# Patient Record
Sex: MALE | Race: WHITE | Employment: OTHER | URBAN - METROPOLITAN AREA
[De-identification: names, ages, dates, MRNs, and addresses within clinical notes are randomized per-mention and may not be internally consistent; named-entity substitution may affect disease eponyms.]

---

## 2016-12-30 RX ORDER — LIDOCAINE HYDROCHLORIDE 10 MG/ML
0.1 INJECTION, SOLUTION EPIDURAL; INFILTRATION; INTRACAUDAL; PERINEURAL ONCE
Status: CANCELLED | OUTPATIENT
Start: 2016-12-30 | End: 2016-12-30

## 2016-12-30 RX ORDER — SODIUM CHLORIDE, SODIUM LACTATE, POTASSIUM CHLORIDE, CALCIUM CHLORIDE 600; 310; 30; 20 MG/100ML; MG/100ML; MG/100ML; MG/100ML
INJECTION, SOLUTION INTRAVENOUS CONTINUOUS
Status: CANCELLED | OUTPATIENT
Start: 2016-12-30

## 2017-01-03 ENCOUNTER — HOSPITAL ENCOUNTER (OUTPATIENT)
Dept: SURGERY | Age: 62
Discharge: HOME | End: 2017-01-03
Attending: INTERNAL MEDICINE | Admitting: INTERNAL MEDICINE

## 2017-01-05 ENCOUNTER — HOSPITAL ENCOUNTER (OUTPATIENT)
Dept: SURGERY | Age: 62
Discharge: HOME | End: 2017-01-05
Attending: INTERNAL MEDICINE | Admitting: INTERNAL MEDICINE

## 2017-01-05 VITALS
WEIGHT: 200 LBS | HEART RATE: 65 BPM | HEIGHT: 73 IN | BODY MASS INDEX: 26.51 KG/M2 | SYSTOLIC BLOOD PRESSURE: 120 MMHG | OXYGEN SATURATION: 96 % | RESPIRATION RATE: 18 BRPM | DIASTOLIC BLOOD PRESSURE: 80 MMHG | TEMPERATURE: 98.8 F

## 2017-01-05 RX ORDER — LIDOCAINE HYDROCHLORIDE 10 MG/ML
0.1 INJECTION, SOLUTION EPIDURAL; INFILTRATION; INTRACAUDAL; PERINEURAL ONCE
Status: DISCONTINUED | OUTPATIENT
Start: 2017-01-05 | End: 2017-01-06 | Stop reason: HOSPADM

## 2017-01-05 RX ORDER — SODIUM CHLORIDE, SODIUM LACTATE, POTASSIUM CHLORIDE, CALCIUM CHLORIDE 600; 310; 30; 20 MG/100ML; MG/100ML; MG/100ML; MG/100ML
INJECTION, SOLUTION INTRAVENOUS CONTINUOUS
Status: DISCONTINUED | OUTPATIENT
Start: 2017-01-05 | End: 2017-01-05

## 2017-01-05 RX ORDER — LIDOCAINE HYDROCHLORIDE 10 MG/ML
0.1 INJECTION, SOLUTION EPIDURAL; INFILTRATION; INTRACAUDAL; PERINEURAL ONCE
Status: DISCONTINUED | OUTPATIENT
Start: 2017-01-05 | End: 2017-01-05

## 2017-01-05 RX ORDER — SODIUM CHLORIDE, SODIUM LACTATE, POTASSIUM CHLORIDE, CALCIUM CHLORIDE 600; 310; 30; 20 MG/100ML; MG/100ML; MG/100ML; MG/100ML
INJECTION, SOLUTION INTRAVENOUS CONTINUOUS
Status: DISCONTINUED | OUTPATIENT
Start: 2017-01-05 | End: 2017-01-06 | Stop reason: HOSPADM

## 2017-01-05 RX ADMIN — SODIUM CHLORIDE, SODIUM LACTATE, POTASSIUM CHLORIDE, CALCIUM CHLORIDE: 600; 310; 30; 20 INJECTION, SOLUTION INTRAVENOUS at 13:22

## 2017-01-05 ASSESSMENT — PAIN - FUNCTIONAL ASSESSMENT: PAIN_FUNCTIONAL_ASSESSMENT: 0-10

## 2017-01-05 ASSESSMENT — PAIN SCALES - GENERAL: PAINLEVEL_OUTOF10: 0

## 2017-01-10 ENCOUNTER — ORTHOTIC/BRACE ENCOUNTER (OUTPATIENT)
Dept: ORTHOPEDIC SURGERY | Age: 62
End: 2017-01-10

## 2017-01-27 ENCOUNTER — ORTHOTIC/BRACE ENCOUNTER (OUTPATIENT)
Dept: ORTHOPEDIC SURGERY | Age: 62
End: 2017-01-27

## 2017-01-27 DIAGNOSIS — G89.29 CHRONIC PAIN OF LEFT ANKLE: Primary | ICD-10-CM

## 2017-01-27 DIAGNOSIS — M19.079 ANKLE ARTHRITIS: ICD-10-CM

## 2017-01-27 DIAGNOSIS — M25.572 CHRONIC PAIN OF LEFT ANKLE: Primary | ICD-10-CM

## 2017-01-27 PROCEDURE — L2275 PLASTIC MOD LOW EXT PAD/LINE: HCPCS | Performed by: PEDORTHIST

## 2017-01-27 PROCEDURE — L1960 AFO POS SOLID ANK PLASTIC MO: HCPCS | Performed by: PEDORTHIST

## 2017-01-27 PROCEDURE — L2820 SOFT INTERFACE BELOW KNEE SE: HCPCS | Performed by: PEDORTHIST

## 2022-04-01 PROCEDURE — 99285 EMERGENCY DEPT VISIT HI MDM: CPT

## 2022-04-02 ENCOUNTER — APPOINTMENT (OUTPATIENT)
Dept: CT IMAGING | Age: 67
End: 2022-04-02
Payer: MEDICARE

## 2022-04-02 ENCOUNTER — HOSPITAL ENCOUNTER (EMERGENCY)
Age: 67
Discharge: ANOTHER HOSPITAL | End: 2022-04-02
Attending: EMERGENCY MEDICINE
Payer: MEDICARE

## 2022-04-02 ENCOUNTER — APPOINTMENT (OUTPATIENT)
Dept: CT IMAGING | Age: 67
DRG: 068 | End: 2022-04-02
Attending: INTERNAL MEDICINE
Payer: MEDICARE

## 2022-04-02 ENCOUNTER — HOSPITAL ENCOUNTER (INPATIENT)
Age: 67
LOS: 4 days | Discharge: HOME | DRG: 068 | End: 2022-04-06
Attending: INTERNAL MEDICINE | Admitting: INTERNAL MEDICINE
Payer: MEDICARE

## 2022-04-02 ENCOUNTER — APPOINTMENT (OUTPATIENT)
Dept: GENERAL RADIOLOGY | Age: 67
End: 2022-04-02
Payer: MEDICARE

## 2022-04-02 VITALS
HEART RATE: 73 BPM | WEIGHT: 200 LBS | SYSTOLIC BLOOD PRESSURE: 99 MMHG | DIASTOLIC BLOOD PRESSURE: 74 MMHG | BODY MASS INDEX: 26.39 KG/M2 | OXYGEN SATURATION: 99 % | TEMPERATURE: 96.7 F | RESPIRATION RATE: 16 BRPM

## 2022-04-02 DIAGNOSIS — I48.91 ATRIAL FIBRILLATION, UNSPECIFIED TYPE (HCC): ICD-10-CM

## 2022-04-02 DIAGNOSIS — I48.0 PAROXYSMAL ATRIAL FIBRILLATION (HCC): Primary | ICD-10-CM

## 2022-04-02 DIAGNOSIS — G45.0 TIA INVOLVING BASILAR ARTERY: ICD-10-CM

## 2022-04-02 DIAGNOSIS — H53.2 DIPLOPIA: Primary | ICD-10-CM

## 2022-04-02 PROBLEM — I63.9 ACUTE CEREBROVASCULAR ACCIDENT (CVA) (HCC): Status: ACTIVE | Noted: 2022-04-02

## 2022-04-02 LAB
A/G RATIO: 1.7 (ref 1.1–2.2)
ALBUMIN SERPL-MCNC: 4.5 G/DL (ref 3.4–5)
ALP BLD-CCNC: 103 U/L (ref 40–129)
ALT SERPL-CCNC: 16 U/L (ref 10–40)
ANION GAP SERPL CALCULATED.3IONS-SCNC: 14 MMOL/L (ref 3–16)
ANION GAP SERPL CALCULATED.3IONS-SCNC: 7 MMOL/L (ref 3–16)
ANISOCYTOSIS: ABNORMAL
AST SERPL-CCNC: 27 U/L (ref 15–37)
BASOPHILS ABSOLUTE: 0 K/UL (ref 0–0.2)
BASOPHILS RELATIVE PERCENT: 0 %
BILIRUB SERPL-MCNC: 0.8 MG/DL (ref 0–1)
BILIRUBIN URINE: NEGATIVE
BLOOD, URINE: ABNORMAL
BUN BLDV-MCNC: 24 MG/DL (ref 7–20)
BUN BLDV-MCNC: 31 MG/DL (ref 7–20)
CALCIUM SERPL-MCNC: 8.2 MG/DL (ref 8.3–10.6)
CALCIUM SERPL-MCNC: 9.4 MG/DL (ref 8.3–10.6)
CHLORIDE BLD-SCNC: 103 MMOL/L (ref 99–110)
CHLORIDE BLD-SCNC: 96 MMOL/L (ref 99–110)
CLARITY: CLEAR
CO2: 25 MMOL/L (ref 21–32)
CO2: 26 MMOL/L (ref 21–32)
COLOR: YELLOW
CREAT SERPL-MCNC: 1.1 MG/DL (ref 0.8–1.3)
CREAT SERPL-MCNC: 1.5 MG/DL (ref 0.8–1.3)
EKG ATRIAL RATE: 170 BPM
EKG DIAGNOSIS: NORMAL
EKG Q-T INTERVAL: 306 MS
EKG QRS DURATION: 82 MS
EKG QTC CALCULATION (BAZETT): 483 MS
EKG R AXIS: 70 DEGREES
EKG T AXIS: 262 DEGREES
EKG VENTRICULAR RATE: 150 BPM
EOSINOPHILS ABSOLUTE: 0 K/UL (ref 0–0.6)
EOSINOPHILS RELATIVE PERCENT: 0 %
GFR AFRICAN AMERICAN: 57
GFR AFRICAN AMERICAN: >60
GFR NON-AFRICAN AMERICAN: 47
GFR NON-AFRICAN AMERICAN: >60
GLUCOSE BLD-MCNC: 114 MG/DL (ref 70–99)
GLUCOSE BLD-MCNC: 92 MG/DL (ref 70–99)
GLUCOSE URINE: NEGATIVE MG/DL
HCT VFR BLD CALC: 51 % (ref 40.5–52.5)
HEMOGLOBIN: 17.2 G/DL (ref 13.5–17.5)
HYALINE CASTS: ABNORMAL /LPF (ref 0–2)
INFLUENZA A: NOT DETECTED
INFLUENZA B: NOT DETECTED
INR BLD: 1 (ref 0.88–1.12)
KETONES, URINE: NEGATIVE MG/DL
LACTIC ACID, SEPSIS: 1.5 MMOL/L (ref 0.4–1.9)
LEUKOCYTE ESTERASE, URINE: NEGATIVE
LYMPHOCYTES ABSOLUTE: 2.9 K/UL (ref 1–5.1)
LYMPHOCYTES RELATIVE PERCENT: 20 %
MCH RBC QN AUTO: 29.1 PG (ref 26–34)
MCHC RBC AUTO-ENTMCNC: 33.8 G/DL (ref 31–36)
MCV RBC AUTO: 86.3 FL (ref 80–100)
MICROSCOPIC EXAMINATION: YES
MONOCYTES ABSOLUTE: 1.5 K/UL (ref 0–1.3)
MONOCYTES RELATIVE PERCENT: 10 %
MUCUS: ABNORMAL /LPF
NEUTROPHILS ABSOLUTE: 10.2 K/UL (ref 1.7–7.7)
NEUTROPHILS RELATIVE PERCENT: 70 %
NITRITE, URINE: NEGATIVE
PDW BLD-RTO: 15.5 % (ref 12.4–15.4)
PH UA: 5.5 (ref 5–8)
PLATELET # BLD: 303 K/UL (ref 135–450)
PLATELET SLIDE REVIEW: ADEQUATE
PMV BLD AUTO: 7.2 FL (ref 5–10.5)
POTASSIUM REFLEX MAGNESIUM: 4 MMOL/L (ref 3.5–5.1)
POTASSIUM SERPL-SCNC: 3.7 MMOL/L (ref 3.5–5.1)
PROCALCITONIN: 0.05 NG/ML (ref 0–0.15)
PROTEIN UA: NEGATIVE MG/DL
PROTHROMBIN TIME: 11.3 SEC (ref 9.9–12.7)
RBC # BLD: 5.91 M/UL (ref 4.2–5.9)
RBC UA: ABNORMAL /HPF (ref 0–4)
SARS-COV-2 RNA, RT PCR: NOT DETECTED
SLIDE REVIEW: ABNORMAL
SODIUM BLD-SCNC: 135 MMOL/L (ref 136–145)
SODIUM BLD-SCNC: 136 MMOL/L (ref 136–145)
SPECIFIC GRAVITY UA: 1.02 (ref 1–1.03)
TOTAL PROTEIN: 7.2 G/DL (ref 6.4–8.2)
TROPONIN: <0.01 NG/ML
TSH REFLEX: 1.46 UIU/ML (ref 0.27–4.2)
URINE REFLEX TO CULTURE: ABNORMAL
URINE TYPE: ABNORMAL
UROBILINOGEN, URINE: 0.2 E.U./DL
WBC # BLD: 14.5 K/UL (ref 4–11)
WBC UA: ABNORMAL /HPF (ref 0–5)

## 2022-04-02 PROCEDURE — 84484 ASSAY OF TROPONIN QUANT: CPT

## 2022-04-02 PROCEDURE — 81001 URINALYSIS AUTO W/SCOPE: CPT

## 2022-04-02 PROCEDURE — 6360000002 HC RX W HCPCS: Performed by: INTERNAL MEDICINE

## 2022-04-02 PROCEDURE — 85610 PROTHROMBIN TIME: CPT

## 2022-04-02 PROCEDURE — 6370000000 HC RX 637 (ALT 250 FOR IP): Performed by: EMERGENCY MEDICINE

## 2022-04-02 PROCEDURE — 96366 THER/PROPH/DIAG IV INF ADDON: CPT

## 2022-04-02 PROCEDURE — 2580000003 HC RX 258: Performed by: EMERGENCY MEDICINE

## 2022-04-02 PROCEDURE — 80053 COMPREHEN METABOLIC PANEL: CPT

## 2022-04-02 PROCEDURE — 84443 ASSAY THYROID STIM HORMONE: CPT

## 2022-04-02 PROCEDURE — 74176 CT ABD & PELVIS W/O CONTRAST: CPT

## 2022-04-02 PROCEDURE — 87040 BLOOD CULTURE FOR BACTERIA: CPT

## 2022-04-02 PROCEDURE — 6360000004 HC RX CONTRAST MEDICATION: Performed by: EMERGENCY MEDICINE

## 2022-04-02 PROCEDURE — 71045 X-RAY EXAM CHEST 1 VIEW: CPT

## 2022-04-02 PROCEDURE — 2580000003 HC RX 258: Performed by: INTERNAL MEDICINE

## 2022-04-02 PROCEDURE — 36415 COLL VENOUS BLD VENIPUNCTURE: CPT

## 2022-04-02 PROCEDURE — 96376 TX/PRO/DX INJ SAME DRUG ADON: CPT

## 2022-04-02 PROCEDURE — 6360000004 HC RX CONTRAST MEDICATION: Performed by: INTERNAL MEDICINE

## 2022-04-02 PROCEDURE — 6370000000 HC RX 637 (ALT 250 FOR IP): Performed by: INTERNAL MEDICINE

## 2022-04-02 PROCEDURE — 2500000003 HC RX 250 WO HCPCS: Performed by: INTERNAL MEDICINE

## 2022-04-02 PROCEDURE — 1200000000 HC SEMI PRIVATE

## 2022-04-02 PROCEDURE — 80048 BASIC METABOLIC PNL TOTAL CA: CPT

## 2022-04-02 PROCEDURE — 70496 CT ANGIOGRAPHY HEAD: CPT

## 2022-04-02 PROCEDURE — 96365 THER/PROPH/DIAG IV INF INIT: CPT

## 2022-04-02 PROCEDURE — 87636 SARSCOV2 & INF A&B AMP PRB: CPT

## 2022-04-02 PROCEDURE — 83605 ASSAY OF LACTIC ACID: CPT

## 2022-04-02 PROCEDURE — 2500000003 HC RX 250 WO HCPCS: Performed by: EMERGENCY MEDICINE

## 2022-04-02 PROCEDURE — 93005 ELECTROCARDIOGRAM TRACING: CPT | Performed by: EMERGENCY MEDICINE

## 2022-04-02 PROCEDURE — 84145 PROCALCITONIN (PCT): CPT

## 2022-04-02 PROCEDURE — 96367 TX/PROPH/DG ADDL SEQ IV INF: CPT

## 2022-04-02 PROCEDURE — 70450 CT HEAD/BRAIN W/O DYE: CPT

## 2022-04-02 PROCEDURE — 85025 COMPLETE CBC W/AUTO DIFF WBC: CPT

## 2022-04-02 PROCEDURE — 80061 LIPID PANEL: CPT

## 2022-04-02 PROCEDURE — 6360000002 HC RX W HCPCS: Performed by: EMERGENCY MEDICINE

## 2022-04-02 PROCEDURE — 83036 HEMOGLOBIN GLYCOSYLATED A1C: CPT

## 2022-04-02 RX ORDER — DILTIAZEM HYDROCHLORIDE 5 MG/ML
5 INJECTION INTRAVENOUS ONCE
Status: COMPLETED | OUTPATIENT
Start: 2022-04-02 | End: 2022-04-02

## 2022-04-02 RX ORDER — DULOXETIN HYDROCHLORIDE 60 MG/1
60 CAPSULE, DELAYED RELEASE ORAL DAILY
COMMUNITY
Start: 2021-11-26

## 2022-04-02 RX ORDER — OXYCODONE HYDROCHLORIDE 15 MG/1
15 TABLET ORAL 4 TIMES DAILY PRN
COMMUNITY
Start: 2021-10-05

## 2022-04-02 RX ORDER — SODIUM CHLORIDE 9 MG/ML
INJECTION, SOLUTION INTRAVENOUS CONTINUOUS
Status: DISCONTINUED | OUTPATIENT
Start: 2022-04-02 | End: 2022-04-04

## 2022-04-02 RX ORDER — 0.9 % SODIUM CHLORIDE 0.9 %
1000 INTRAVENOUS SOLUTION INTRAVENOUS ONCE
Status: DISCONTINUED | OUTPATIENT
Start: 2022-04-02 | End: 2022-04-02

## 2022-04-02 RX ORDER — METRONIDAZOLE 250 MG/1
500 TABLET ORAL EVERY 8 HOURS SCHEDULED
Status: DISCONTINUED | OUTPATIENT
Start: 2022-04-02 | End: 2022-04-06 | Stop reason: HOSPADM

## 2022-04-02 RX ORDER — 0.9 % SODIUM CHLORIDE 0.9 %
30 INTRAVENOUS SOLUTION INTRAVENOUS ONCE
Status: COMPLETED | OUTPATIENT
Start: 2022-04-02 | End: 2022-04-02

## 2022-04-02 RX ORDER — HEPARIN SODIUM 5000 [USP'U]/ML
5000 INJECTION, SOLUTION INTRAVENOUS; SUBCUTANEOUS EVERY 8 HOURS SCHEDULED
Status: DISCONTINUED | OUTPATIENT
Start: 2022-04-02 | End: 2022-04-04

## 2022-04-02 RX ORDER — CIPROFLOXACIN 500 MG/1
500 TABLET, FILM COATED ORAL EVERY 12 HOURS SCHEDULED
Status: DISCONTINUED | OUTPATIENT
Start: 2022-04-02 | End: 2022-04-06 | Stop reason: HOSPADM

## 2022-04-02 RX ORDER — OXYCODONE HYDROCHLORIDE 15 MG/1
15 TABLET ORAL 4 TIMES DAILY PRN
Status: DISCONTINUED | OUTPATIENT
Start: 2022-04-02 | End: 2022-04-06 | Stop reason: HOSPADM

## 2022-04-02 RX ORDER — ONDANSETRON 2 MG/ML
4 INJECTION INTRAMUSCULAR; INTRAVENOUS EVERY 6 HOURS PRN
Status: DISCONTINUED | OUTPATIENT
Start: 2022-04-02 | End: 2022-04-06 | Stop reason: HOSPADM

## 2022-04-02 RX ORDER — DULOXETIN HYDROCHLORIDE 60 MG/1
60 CAPSULE, DELAYED RELEASE ORAL DAILY
Status: DISCONTINUED | OUTPATIENT
Start: 2022-04-02 | End: 2022-04-06 | Stop reason: HOSPADM

## 2022-04-02 RX ORDER — ASPIRIN 81 MG/1
81 TABLET, CHEWABLE ORAL ONCE
Status: COMPLETED | OUTPATIENT
Start: 2022-04-02 | End: 2022-04-02

## 2022-04-02 RX ORDER — LABETALOL HYDROCHLORIDE 5 MG/ML
10 INJECTION, SOLUTION INTRAVENOUS EVERY 10 MIN PRN
Status: DISCONTINUED | OUTPATIENT
Start: 2022-04-02 | End: 2022-04-06 | Stop reason: HOSPADM

## 2022-04-02 RX ORDER — ATORVASTATIN CALCIUM 80 MG/1
80 TABLET, FILM COATED ORAL NIGHTLY
Status: DISCONTINUED | OUTPATIENT
Start: 2022-04-02 | End: 2022-04-06 | Stop reason: HOSPADM

## 2022-04-02 RX ORDER — 0.9 % SODIUM CHLORIDE 0.9 %
1000 INTRAVENOUS SOLUTION INTRAVENOUS ONCE
Status: COMPLETED | OUTPATIENT
Start: 2022-04-02 | End: 2022-04-02

## 2022-04-02 RX ORDER — ONDANSETRON 4 MG/1
4 TABLET, ORALLY DISINTEGRATING ORAL EVERY 8 HOURS PRN
Status: DISCONTINUED | OUTPATIENT
Start: 2022-04-02 | End: 2022-04-06 | Stop reason: HOSPADM

## 2022-04-02 RX ORDER — POLYETHYLENE GLYCOL 3350 17 G/17G
17 POWDER, FOR SOLUTION ORAL DAILY PRN
Status: DISCONTINUED | OUTPATIENT
Start: 2022-04-02 | End: 2022-04-06 | Stop reason: HOSPADM

## 2022-04-02 RX ORDER — ASPIRIN 300 MG/1
300 SUPPOSITORY RECTAL DAILY
Status: DISCONTINUED | OUTPATIENT
Start: 2022-04-02 | End: 2022-04-06 | Stop reason: HOSPADM

## 2022-04-02 RX ORDER — ASPIRIN 81 MG/1
81 TABLET ORAL DAILY
Status: DISCONTINUED | OUTPATIENT
Start: 2022-04-02 | End: 2022-04-06 | Stop reason: HOSPADM

## 2022-04-02 RX ADMIN — OXYCODONE HYDROCHLORIDE 15 MG: 15 TABLET ORAL at 15:13

## 2022-04-02 RX ADMIN — DILTIAZEM HYDROCHLORIDE 2.5 MG/HR: 5 INJECTION INTRAVENOUS at 12:32

## 2022-04-02 RX ADMIN — SODIUM CHLORIDE: 9 INJECTION, SOLUTION INTRAVENOUS at 12:38

## 2022-04-02 RX ADMIN — VANCOMYCIN HYDROCHLORIDE 1750 MG: 1 INJECTION, POWDER, LYOPHILIZED, FOR SOLUTION INTRAVENOUS at 07:42

## 2022-04-02 RX ADMIN — ASPIRIN 81 MG: 81 TABLET, COATED ORAL at 16:17

## 2022-04-02 RX ADMIN — SODIUM CHLORIDE 1000 ML: 9 INJECTION, SOLUTION INTRAVENOUS at 01:04

## 2022-04-02 RX ADMIN — METRONIDAZOLE 500 MG: 250 TABLET ORAL at 15:08

## 2022-04-02 RX ADMIN — IOPAMIDOL 85 ML: 755 INJECTION, SOLUTION INTRAVENOUS at 00:42

## 2022-04-02 RX ADMIN — SODIUM CHLORIDE 2721 ML: 9 INJECTION, SOLUTION INTRAVENOUS at 01:20

## 2022-04-02 RX ADMIN — DULOXETINE HYDROCHLORIDE 60 MG: 60 CAPSULE, DELAYED RELEASE ORAL at 15:08

## 2022-04-02 RX ADMIN — DILTIAZEM HYDROCHLORIDE 5 MG: 5 INJECTION INTRAVENOUS at 01:12

## 2022-04-02 RX ADMIN — HEPARIN SODIUM 5000 UNITS: 5000 INJECTION INTRAVENOUS; SUBCUTANEOUS at 21:08

## 2022-04-02 RX ADMIN — CIPROFLOXACIN 500 MG: 500 TABLET, FILM COATED ORAL at 21:07

## 2022-04-02 RX ADMIN — OXYCODONE HYDROCHLORIDE 15 MG: 15 TABLET ORAL at 22:53

## 2022-04-02 RX ADMIN — HEPARIN SODIUM 5000 UNITS: 5000 INJECTION INTRAVENOUS; SUBCUTANEOUS at 15:08

## 2022-04-02 RX ADMIN — ATORVASTATIN CALCIUM 80 MG: 80 TABLET, FILM COATED ORAL at 21:07

## 2022-04-02 RX ADMIN — DILTIAZEM HYDROCHLORIDE 5 MG/HR: 5 INJECTION, SOLUTION INTRAVENOUS at 01:14

## 2022-04-02 RX ADMIN — IOHEXOL 50 ML: 240 INJECTION, SOLUTION INTRATHECAL; INTRAVASCULAR; INTRAVENOUS; ORAL at 12:33

## 2022-04-02 RX ADMIN — CEFEPIME 2000 MG: 2 INJECTION, POWDER, FOR SOLUTION INTRAVENOUS at 06:06

## 2022-04-02 RX ADMIN — ASPIRIN 81 MG 81 MG: 81 TABLET ORAL at 01:15

## 2022-04-02 RX ADMIN — METRONIDAZOLE 500 MG: 250 TABLET ORAL at 21:07

## 2022-04-02 ASSESSMENT — PAIN SCALES - GENERAL
PAINLEVEL_OUTOF10: 0
PAINLEVEL_OUTOF10: 0
PAINLEVEL_OUTOF10: 5
PAINLEVEL_OUTOF10: 0
PAINLEVEL_OUTOF10: 6

## 2022-04-02 NOTE — H&P
Hospital Medicine History & Physical      PCP: Omkar Fernandez MD    Date of Admission: 4/2/2022    Date of Service: Pt seen/examined on 04/02/22 and Admitted to Inpatient with expected LOS greater than two midnights due to medical therapy of diplopia, suspected CVA. Chief Complaint: Double vision      History Of Present Illness:      74 y.o. male who presented to his local emergency department for double vision and dizziness that started acutely when he was chopping wood at home. Initially had double vision, followed by generalized fatigue and some palpitations in addition to dizziness. Thought he would pass out, but he did not. No spinning sensation. Did not fall, did not lose consciousness. Waited at home, and then came to the emergency department once he decided that things were not going to get better. At the emergency department, there was a suspicion for an acute CVA. Stroke team was called. tPA was not administered due to patient being out of time window. Aspirin was started and patient was transferred to our hospital for admission. Also noted that patient had leukocytosis. Empiric antibiotic a single dose was given. No fever, no chills. Diagnosed with atrial fibrillation, which seems to be something new. Patient states that last week he noticed another episode of diverticulitis that he had before. Switched to clear liquid on his own and noted some improvement couple days ago. However, he has not had any bowel movements and still was not able to eat much. The episode of suspected diverticulitis started with nausea and vomiting. Since then, oral intake has been reduced.   No other accompanying symptoms  Nothing else that makes the patient feel better or worse    Past Medical History:          Diagnosis Date    Arthritis     Cerebral artery occlusion with cerebral infarction [facility])        Past Surgical History:          Procedure Laterality Date    ANKLE SURGERY Left     surgeries x3- 2007,2008,2011    BACK SURGERY      COLONOSCOPY  11/20/2007    WNL    COLONOSCOPY  05 Jan 2017    diverticulosis    FRACTURE SURGERY      JOINT REPLACEMENT         Medications Prior to Admission:      Prior to Admission medications    Medication Sig Start Date End Date Taking? Authorizing Provider   DULoxetine (CYMBALTA) 60 MG extended release capsule Take 60 mg by mouth daily 11/26/21   Historical Provider, MD   oxyCODONE (OXY-IR) 15 MG immediate release tablet Take 15 mg by mouth 4 times daily as needed. 10/5/21   Historical Provider, MD       Allergies:  Patient has no known allergies. Social History:      The patient currently lives at home    TOBACCO:   reports that he has been smoking. He started smoking about 39 years ago. He has a 40.00 pack-year smoking history. He has never used smokeless tobacco.  ETOH:   reports no history of alcohol use. E-Cigarettes/Vaping Use     Questions Responses    E-Cigarette/Vaping Use Never User    Start Date     Passive Exposure     Quit Date     Counseling Given     Comments             Family History:      Reviewed in detail and negative for DM, CAD, Cancer, CVA. Positive as follows:        Problem Relation Age of Onset    Other Mother         whipple procedure for what they thought was pancreatic tumor-- no tumor found    Cancer Maternal Grandmother         colon cancer - age late 63's       REVIEW OF SYSTEMS COMPLETED:   Pertinent positives as noted in the HPI. Double vision. All other systems reviewed and negative. PHYSICAL EXAM PERFORMED:    /74   Pulse 77   Temp 98.3 °F (36.8 °C) (Oral)   Resp 18   Ht 6' 1\" (1.854 m)   Wt 184 lb 12.8 oz (83.8 kg)   SpO2 99%   BMI 24.38 kg/m²     General appearance:  No apparent distress, appears stated age and cooperative. HEENT:  Normal cephalic, atraumatic without obvious deformity. Pupils equal, round, and reactive to light. Extra ocular muscles intact. Conjunctivae/corneas clear.   Neck: Supple, with full range of motion. No jugular venous distention. Trachea midline. Respiratory:  Normal respiratory effort. Clear to auscultation, bilaterally without Rales/Wheezes/Rhonchi. Cardiovascular: Irregularly irregular rhythm with normal S1/S2 without murmurs, rubs or gallops. Abdomen: Soft, lower abdomen tender, more on the right than left with no rebound tenderness. Abdomen is non-distended with normal bowel sounds. Musculoskeletal:  No clubbing, cyanosis or edema bilaterally. Full range of motion without deformity. Skin: Skin color, texture, turgor normal.  No rashes or lesions. Neurologic:  Neurovascularly intact without any focal sensory/motor deficits.  Cranial nerves: II-XII intact, grossly non-focal.  Psychiatric:  Alert and oriented, thought content appropriate, normal insight  Capillary Refill: Brisk,3 seconds, normal  Peripheral Pulses: +2 palpable, equal bilaterally       Labs:     Recent Labs     04/02/22 0011   WBC 14.5*   HGB 17.2   HCT 51.0        Recent Labs     04/02/22 0011   *   K 4.0   CL 96*   CO2 25   BUN 31*   CREATININE 1.5*   CALCIUM 9.4     Recent Labs     04/02/22  0011   AST 27   ALT 16   BILITOT 0.8   ALKPHOS 103     Recent Labs     04/02/22 0011   INR 1.00     Recent Labs     04/02/22  0011   TROPONINI <0.01       Urinalysis:      Lab Results   Component Value Date    NITRU Negative 04/02/2022    WBCUA 0-2 04/02/2022    RBCUA 5-10 04/02/2022    BLOODU SMALL 04/02/2022    SPECGRAV 1.020 04/02/2022    GLUCOSEU Negative 04/02/2022       Radiology:     CXR: I have reviewed the CXR with the following interpretation: Not done today  EKG:  I have reviewed the EKG with the following interpretation: Atrial fibrillation with rapid ventricular response    No orders to display       ASSESSMENT:    Active Hospital Problems    Diagnosis Date Noted    Acute cerebrovascular accident (CVA) ([facility] [address] [I63.9] 04/02/2022         PLAN:    Double vision  Suspected but not diagnosed acute CVA.  Neurology consulted  MRI of the brain ordered  No TPA as the patient is out of time window    New onset atrial fibrillation  Started with rapid response, currently heart rate is controlled. Cardiology consulted  TSH and troponins ordered  Echocardiogram to be done. Leukocytosis  Could be secondary to acute diverticulitis. Procalcitonin is normal.  CT scan of the abdomen and pelvis ordered. Suspected acute diverticulitis  Patient has self diagnosed with acute diverticulitis last week, based on previous symptoms. Indeed, physical exam shows lower abdominal tenderness. I ordered a CT scan of the abdomen and pelvis and started the patient on oral Cipro and Flagyl combination for now. My main concern is possibility of perforation or abscess formation. Because of elevated creatinine, I will order CT scan with oral contrast only. Acute kidney injury  Baseline creatinine 0.8. Today's creatinine is 1.5. Could be related to dehydration. We will start the patient on IV fluids for now. Poor oral intake noted over past week    Discussed with the patient. Questions answered    DVT Prophylaxis: Subcutaneous heparin. Awaiting cardiology and neurology opinions for therapeutic anticoagulation  Diet: Diet NPO Exceptions are: Ice Chips, Sips of Water with Meds, Sips of Clear Liquids. Proceed to full liquids patient passes swallow screen  Code Status: Full Code    PT/OT Eval Status: Requested    Dispo -inpatient stay pending specialist opinion       Tristian Booth MD    Thank you Tomer Seals MD for the opportunity to be involved in this patient's care. If you have any questions or concerns please feel free to contact me at [phone].

## 2022-04-02 NOTE — PROGRESS NOTES
TODAYS DATE:  4/2/2022    Discussed personal risk factors for Stroke /TIA with patient/family, and ways to reduce the risk for a recurrent stroke. Patient's personal risk factors which were identified are:     [] Alcohol Abuse: check with your physician before any alcohol consumption. [] Atrial fibrillation: may cause blood clots. [] Drug Abuse: Seek help, talk with your doctor  [] Clotting Disorder  [] Diabetes  [] Family history of stroke or heart disease  [x] High Blood Pressure/Hypertension: work with your physician.  [] High cholesterol: monitor cholesterol levels with your physician.   [] Overweight/Obesity: work with your physician for your ideal body weight.  [] Physical Inactivity: get regular exercise as directed by your physician. [] Personal history of previous TIA or stroke  [] Poor Diet; decrease salt (sodium) in your diet, follow diet directed by physician. [x] Smoking: Cigarette/Cigar: stop smoking. Reviewed the Following Education with Patient and/or Family:   -Signs and Symptoms of Stroke:     (facial droop, weakness/numbness especially on one side, speech difficulty, sudden confusion, sudden loss of vision, sudden severe headache,       sudden loss of balance or having difficulty walking, syncope or seizure)  -How to activate EMS (911)   -Importance of Follow Up Appointment at Discharge   -Importance of Compliance with Medications Prescribed at Discharge     Pt verbalized understanding.      Family Present during Education: yes     Stroke Education Booklet at Bedside: yes     Electronically signed by Anita Hernandez RN on 4/2/2022 at 11:09 AM

## 2022-04-02 NOTE — ED NOTES
Transfer center to try for bed at [facility] due to no bed at [address]  04/02/22 0581

## 2022-04-02 NOTE — ED NOTES
Pt stable for transfer to [facility]. Benefits of transfer explained,pt verbalized understanding and signed EMTALA transfer form. Writer gave bedside report to [employer] transfer tram, gave phone report to [employer], who assumes care when pt is transferred to [facility]. Pt exited unit via stretcher under no duress.       Song Miller RN  04/02/22 0116

## 2022-04-02 NOTE — ED NOTES
Writer ordered breakfast tray for pt. Pt passed swallow eval at bedside.       Apollo Jurado, RN  04/02/22 4549

## 2022-04-02 NOTE — ED NOTES
Cardizem gtt increased to 7.5 mg/hour. Heart rate 110-130     [facility] [address] [place] [address]  04/02/22 2804

## 2022-04-02 NOTE — ED NOTES
Pt placed on monitor on return from CT scan and found to be in A Fib wit RVR. Dr Sebastian Orantes called to room EKG done.       Philipp Denise RN  04/02/22 3592

## 2022-04-02 NOTE — ED NOTES
Clarified LKW with Dr Hammad Waggoner. Pt states LKW was approx 1800.       Jordan Bales RN  04/02/22 5951

## 2022-04-02 NOTE — ED NOTES
Dr Sebastian Orantes aware of pt LKW  and symptoms.                   Charlie Verdin RN  04/02/22 1981

## 2022-04-02 NOTE — ED NOTES
Code Stroke called at [facility].  CT notified at that time, and stroke team paged at [facility]  04/02/22 0025

## 2022-04-02 NOTE — ED PROVIDER NOTES
Emergency Department provider note    CHIEF COMPLAINT  Diplopia (states off balance and double vision starting approx 1800)      HISTORY OF PRESENT ILLNESS  Horald Bloch is a 74 y.o. male presents to the emergency department for evaluation of double vision and dizziness. Patient reports last known normal was at 6 PM.  States he was chopping voids and he felt like he had blurry vision, followed by fatigue, some palpitations. Says he felt dizziness which she describes as feeling like he could pass out. Denies having room spinning sensation. No falls. No injury to head. Says he has been having vomiting and diarrhea and was diagnosed with diverticulitis on Wednesday. Denies having history of atrial fibrillation that he is aware of. Not on any blood thinners. No aspirin. No other complaints, modifying factors or associated symptoms. I have reviewed the following from the nursing documentation. History reviewed. No pertinent past medical history.   Past Surgical History:   Procedure Laterality Date    ANKLE SURGERY Left     surgeries x3- 2007,2008,2011    COLONOSCOPY  11/20/2007    WNL    COLONOSCOPY  05 Jan 2017    diverticulosis     Family History   Problem Relation Age of Onset    Other Mother         whipple procedure for what they thought was pancreatic tumor-- no tumor found    Cancer Maternal Grandmother         colon cancer - age late 63's     Social History     Socioeconomic History    Marital status:      Spouse name: Not on file    Number of children: Not on file    Years of education: Not on file    Highest education level: Not on file   Occupational History    Not on file   Tobacco Use    Smoking status: Current Every Day Smoker     Packs/day: 1.00     Years: 30.00     Pack years: 30.00    Smokeless tobacco: Not on file   Substance and Sexual Activity    Alcohol use: No     Comment: very rarely    Drug use: Yes     Comment: percocet for chronic ankle pain- ordered by physician    Sexual activity: Not on file   Other Topics Concern    Not on file   Social History Narrative    Not on file     Social Determinants of Health     Financial Resource Strain:     Difficulty of Paying Living Expenses: Not on file   Food Insecurity:     Worried About [address] in the Last Year: Not on file    Crystal of Food in the Last Year: Not on file   Transportation Needs:     Lack of Transportation (Medical): Not on file    Lack of Transportation (Non-Medical):  Not on file   Physical Activity:     Days of Exercise per Week: Not on file    Minutes of Exercise per Session: Not on file   Stress:     Feeling of Stress : Not on file   Social Connections:     Frequency of Communication with Friends and Family: Not on file    Frequency of Social Gatherings with Friends and Family: Not on file    Attends Islam Services: Not on file    Active Member of [address] [place] [address] or Organizations: Not on file    Attends Club or Organization Meetings: Not on file    Marital Status: Not on file   Intimate Partner Violence:     Fear of Current or Ex-Partner: Not on file    Emotionally Abused: Not on file    Physically Abused: Not on file    Sexually Abused: Not on file   Housing Stability:     Unable to Pay for Housing in the Last Year: Not on file    Number of [place] in the Last Year: Not on file    Unstable Housing in the Last Year: Not on file     Current Facility-Administered Medications   Medication Dose Route Frequency Provider Last Rate Last Admin    dilTIAZem 125 mg in dextrose 5 % 125 mL infusion  2.5-15 mg/hr IntraVENous Continuous Eve Hernández MD   Stopped at 04/02/22 0708    vancomycin (VANCOCIN) 1,750 mg in sodium chloride 0.9 % 500 mL IVPB  1,750 mg IntraVENous Once Eve Hernández MD         Current Outpatient Medications   Medication Sig Dispense Refill    DULoxetine (CYMBALTA) 60 MG extended release capsule Take 60 mg by mouth daily      oxyCODONE (OXY-IR) 15 MG immediate release tablet Take 15 mg by mouth 4 times daily as needed. No Known Allergies    REVIEW OF SYSTEMS  10 systems reviewed, pertinent positives per HPI otherwise noted to be negative. PHYSICAL EXAM  BP 99/72   Pulse 65   Temp 96.7 °F (35.9 °C) (Temporal)   Resp 14   Wt 200 lb (90.7 kg)   SpO2 100%   BMI 26.39 kg/m²   GENERAL APPEARANCE: Awake and alert. Cooperative. No acute distress. HEAD: Normocephalic. Atraumatic. ENT: Mucous membranes are moist.   NECK: Supple. HEART: RRR. No murmurs. LUNGS: Respirations unlabored. CTAB. Good air exchange. Speaking comfortably in full sentences. ABDOMEN: Soft. Non-distended. Non-tender. No masses. No organomegaly. No guarding or rebound. EXTREMITIES: No peripheral edema. Moves all extremities equally. All extremities neurovascularly intact. SKIN: Warm and dry. No acute rashes. NEUROLOGICAL: Alert and oriented. Please see NIH Stroke Scale below. PSYCHIATRIC: Normal mood and affect. NIH Stroke Scale     Time: arrival to ED   Person Administering Scale: Unique Fox MD   Administer stroke scale items in the order listed. Record performance in each category after each subscale exam. Do not go back and change scores. Follow directions provided for each exam technique. Scores should reflect what the patient does, not what the clinician thinks the patient can do. The clinician should record answers while administering the exam and work quickly. Except where indicated, the patient should not be coached (i.e., repeated requests to patient to make a special effort). 1a  Level of consciousness: 0=alert; keenly responsive   1b. LOC questions:  0=Performs both tasks correctly   1c. LOC commands: 0=Performs both tasks correctly   2. Best Gaze: 1=partial gaze palsy   3. Visual: 0=No visual loss   4. Facial Palsy: 0=Normal symmetric movement   5a. Motor left arm: 0=No drift, limb holds 90 (or 45) degrees for full 10 seconds   5b. Motor right arm: 0=No drift, limb holds 90 (or 45) degrees for full 10 seconds   6a. motor left le=No drift, limb holds 90 (or 45) degrees for full 10 seconds   6b  Motor right le=No drift, limb holds 90 (or 45) degrees for full 10 seconds   7. Limb Ataxia: 0=Absent   8. Sensory: 0=Normal; no sensory loss   9. Best Language:  0=No aphasia, normal   10. Dysarthria: 0=Normal   11. Extinction and Inattention: 0=No abnormality   12. Distal motor function: 0=Normal    Total:  1       LABS  I have reviewed all labs for this visit.    Results for orders placed or performed during the hospital encounter of 22   COVID-19 & Influenza Combo    Specimen: Nasopharyngeal Swab   Result Value Ref Range    SARS-CoV-2 RNA, RT PCR NOT DETECTED NOT DETECTED    INFLUENZA A NOT DETECTED NOT DETECTED    INFLUENZA B NOT DETECTED NOT DETECTED   CBC Auto Differential   Result Value Ref Range    WBC 14.5 (H) 4.0 - 11.0 K/uL    RBC 5.91 (H) 4.20 - 5.90 M/uL    Hemoglobin 17.2 13.5 - 17.5 g/dL    Hematocrit 51.0 40.5 - 52.5 %    MCV 86.3 80.0 - 100.0 fL    MCH 29.1 26.0 - 34.0 pg    MCHC 33.8 31.0 - 36.0 g/dL    RDW 15.5 (H) 12.4 - 15.4 %    Platelets 356 729 - 039 K/uL    MPV 7.2 5.0 - 10.5 fL    PLATELET SLIDE REVIEW Adequate     SLIDE REVIEW see below     Neutrophils % 70.0 %    Lymphocytes % 20.0 %    Monocytes % 10.0 %    Eosinophils % 0.0 %    Basophils % 0.0 %    Neutrophils Absolute 10.2 (H) 1.7 - 7.7 K/uL    Lymphocytes Absolute 2.9 1.0 - 5.1 K/uL    Monocytes Absolute 1.5 (H) 0.0 - 1.3 K/uL    Eosinophils Absolute 0.0 0.0 - 0.6 K/uL    Basophils Absolute 0.0 0.0 - 0.2 K/uL    Anisocytosis Occasional (A)    Comprehensive Metabolic Panel w/ Reflex to MG   Result Value Ref Range    Sodium 135 (L) 136 - 145 mmol/L    Potassium reflex Magnesium 4.0 3.5 - 5.1 mmol/L    Chloride 96 (L) 99 - 110 mmol/L    CO2 25 21 - 32 mmol/L    Anion Gap 14 3 - 16    Glucose 114 (H) 70 - 99 mg/dL    BUN 31 (H) 7 - 20 mg/dL    CREATININE 1.5 (H) 0.8 - 1.3 mg/dL    GFR Non- 47 (A) >60    GFR  57 (A) >60    Calcium 9.4 8.3 - 10.6 mg/dL    Total Protein 7.2 6.4 - 8.2 g/dL    Albumin 4.5 3.4 - 5.0 g/dL    Albumin/Globulin Ratio 1.7 1.1 - 2.2    Total Bilirubin 0.8 0.0 - 1.0 mg/dL    Alkaline Phosphatase 103 40 - 129 U/L    ALT 16 10 - 40 U/L    AST 27 15 - 37 U/L   Troponin   Result Value Ref Range    Troponin <0.01 <0.01 ng/mL   Protime-INR   Result Value Ref Range    Protime 11.3 9.9 - 12.7 sec    INR 1.00 0.88 - 1.12   TSH with Reflex   Result Value Ref Range    TSH 1.46 0.27 - 4.20 uIU/mL   Lactate, Sepsis   Result Value Ref Range    Lactic Acid, Sepsis 1.5 0.4 - 1.9 mmol/L   EKG 12 Lead   Result Value Ref Range    Ventricular Rate 150 BPM    Atrial Rate 170 BPM    QRS Duration 82 ms    Q-T Interval 306 ms    QTc Calculation (Bazett) 483 ms    R Axis 70 degrees    T Axis 262 degrees    Diagnosis       Atrial fibrillation with rapid ventricular responseST & T wave abnormality, consider inferior ischemiaAbnormal ECGNo previous ECGs available       EKG Interpretation    Interpreted by emergency department physician    The Ekg interpreted by me shows  Atrial fibrillation with RVR with a rate of 150  Axis is normal  QTc is acceptable   ST Segments: ST and T wave abnormality, consider inferior ischemia      RADIOLOGY    XR CHEST PORTABLE   Final Result   Clear lungs. CTA HEAD NECK W CONTRAST   Final Result   No flow-limiting arterial stenosis in the head and neck. CT HEAD WO CONTRAST   Final Result   No acute intracranial abnormality. TIMES:  Last Known Well: 04/01/22 at 1800. Arrival to ED: 4368  CT First Slice: 6253  CT Results: 6304  Stroke Team: Case discussed with  Stroke Team, Dr. Sandeep Spencer at 0852. ED COURSE/MDM  Patient seen and evaluated. Old records reviewed. Labs and imaging reviewed and results discussed.   Patient initially presented to the emergency department with complaint of shortness of breath and fatigue. Later, patient complained of double vision and was notified at [address] [facility] [address]. patient was stroke alerted at [facility]. As he reports last known normal was at 6 PM and symptom onset less than 24 hours ago. I spoke with Dr. Maxine Pickard with  stroke team who agrees that patient would not be a candidate for TPA given patient is out of the TPA window. NIH scale was 1. Visual acuity 20/25 right and left. Patient placed on cardiac monitor for close observation. At this time, patient was noted to be in atrial fibrillation. Initial heart rate was 70 in triage. At this time, he has heart rate up to 140. Denies having history of atrial fibrillation. He was given diltiazem bolus and drip. He was borderline hypotensive. He was initially given 5 mg bolus of diltiazem instead of the 10. differential diagnosis includes infectious etiology. He was given 30 cc/kg IV fluid bolus. COVID swab obtained and was negative. Lactate normal at 1.5. CT shows no acute intracranial bleed. He was given 81 mg aspirin. CTA head and neck shows no flow-limiting arterial stenosis in the head and neck. I spoke with Dr. Maxine Pickard who recommends admission for further stroke work-up. No other recommendations at this time. He has borderline leukocytosis with WBC of 14.5. TSH wnl at 1.46. Troponin normal. Patient updated on the results of the work-up. His creatinine is 1.5. Per chart review, creatinine was 0.89 in November 24, 2021. On reassessment, heart rate improved. Blood pressure improved to 325 systolic. Hospitalist at [facility] consulted for admission and graciously accepted by Dr. Ayanna Wright. Although suspect his blood pressure is related to his atrial fibrillation, added on vancomycin and cefepime for empiric antibiotics. Pending urine at this time.   Pending transport to [facility].    t-PA NOT given due to the following EXCLUSION CRITERIA (only those checked):  [] Pregnancy  [x] Symptoms > 3 hours of onset  [] Minor or isolated neurological signs  [] Non-disabling stroke  [] Seizure at the same time of stroke symptoms  [] Active bleeding or acute trauma (fracture)  [] Presentation consistent with acute MI or post-MI pericarditis  [] Known intracranial neoplasm, AV malformation or aneurysm  [] CT evidence of intracranial hemorrhage  [] Any prior history of intracranial hemorrhage  [] Symptoms suggestive of subarachnoid hemorrhage (even if head CT normal)  [] Persistent hypertension (SBP>185 or DBP>110)  [] Glucose < 50 or > 400. [] Bleeding diathesis, including but not limited to:   -Platelets < 527,909   -Heparin within 48 hours with PTT > normal range   -Current or recent use of anticoagulants (dabagtran, rivaroxaban, or warfarin with     INR > 1.7)  [] Lumbar puncture in past 7 days  [] Arterial puncture at a noncompressible site in past 7 days  [] Major surgery in past 14 days  [] Gastrointestinal or urinary tract hemorrhage in past 21 days  [] Myocardial infarction in past 3 months  [] Stroke, intracranial surgery or serious head trauma in past 3 months    CLINICAL IMPRESSION  1. Diplopia    2. Atrial fibrillation, unspecified type (HCC)        Blood pressure 99/72, pulse 65, temperature 96.7 °F (35.9 °C), temperature source Temporal, resp. rate 14, weight 200 lb (90.7 kg), SpO2 100 %. Lis Mcgraw was admitted in stable condition. CRITICAL CARE TIME:  Total critical care time today provided was 30 minutes. This excludes seperately billable procedures and family discussion time. Patricia Yeboah MD    Comment: Please note this report has been produced using speech recognition software and may contain errors related to that system including errors in grammar, punctuation, and spelling, as well as words and phrases that may be inappropriate. If there are any questions or concerns please feel free to contact the dictating provider for clarification.       Patricia Yeboah MD  04/02/22 7498

## 2022-04-02 NOTE — ED NOTES
Transfer center called to notify that [facility] does not have a bed for this patient, but states [facility] should have bed by shift change.      Vashti Merino  04/02/22 0556

## 2022-04-02 NOTE — PLAN OF CARE
Problem: HEMODYNAMIC STATUS  Goal: Patient has stable vital signs and fluid balance  4/2/2022 1043 by Catalina Avilez RN  Outcome: Met This Shift  4/2/2022 1042 by Catalina Avilez RN  Outcome: Met This Shift     Problem: ACTIVITY INTOLERANCE/IMPAIRED MOBILITY  Goal: Mobility/activity is maintained at optimum level for patient  Outcome: Met This Shift     Problem: COMMUNICATION IMPAIRMENT  Goal: Ability to express needs and understand communication  Outcome: Met This Shift

## 2022-04-02 NOTE — ED NOTES
Patient going to bed 366 at [facility]. Call report to [phone]. Transfer center setting up transport.      Vashti Merino  04/02/22 6892

## 2022-04-03 ENCOUNTER — APPOINTMENT (OUTPATIENT)
Dept: MRI IMAGING | Age: 67
DRG: 068 | End: 2022-04-03
Attending: INTERNAL MEDICINE
Payer: MEDICARE

## 2022-04-03 LAB
A/G RATIO: 1.6 (ref 1.1–2.2)
ALBUMIN SERPL-MCNC: 3.5 G/DL (ref 3.4–5)
ALP BLD-CCNC: 72 U/L (ref 40–129)
ALT SERPL-CCNC: 13 U/L (ref 10–40)
ANION GAP SERPL CALCULATED.3IONS-SCNC: 9 MMOL/L (ref 3–16)
AST SERPL-CCNC: 16 U/L (ref 15–37)
BILIRUB SERPL-MCNC: 0.4 MG/DL (ref 0–1)
BUN BLDV-MCNC: 18 MG/DL (ref 7–20)
CALCIUM SERPL-MCNC: 8.5 MG/DL (ref 8.3–10.6)
CHLORIDE BLD-SCNC: 108 MMOL/L (ref 99–110)
CHOLESTEROL, TOTAL: 135 MG/DL (ref 0–199)
CO2: 26 MMOL/L (ref 21–32)
CREAT SERPL-MCNC: 1 MG/DL (ref 0.8–1.3)
ESTIMATED AVERAGE GLUCOSE: 114 MG/DL
GFR AFRICAN AMERICAN: >60
GFR NON-AFRICAN AMERICAN: >60
GLUCOSE BLD-MCNC: 97 MG/DL (ref 70–99)
HBA1C MFR BLD: 5.6 %
HCT VFR BLD CALC: 40.9 % (ref 40.5–52.5)
HDLC SERPL-MCNC: 31 MG/DL (ref 40–60)
HEMOGLOBIN: 13.5 G/DL (ref 13.5–17.5)
LDL CHOLESTEROL CALCULATED: 72 MG/DL
MCH RBC QN AUTO: 28.7 PG (ref 26–34)
MCHC RBC AUTO-ENTMCNC: 33 G/DL (ref 31–36)
MCV RBC AUTO: 87 FL (ref 80–100)
PDW BLD-RTO: 14.6 % (ref 12.4–15.4)
PLATELET # BLD: 208 K/UL (ref 135–450)
PMV BLD AUTO: 7.4 FL (ref 5–10.5)
POTASSIUM REFLEX MAGNESIUM: 4 MMOL/L (ref 3.5–5.1)
RBC # BLD: 4.7 M/UL (ref 4.2–5.9)
SODIUM BLD-SCNC: 143 MMOL/L (ref 136–145)
T4 FREE: 1.5 NG/DL (ref 0.9–1.8)
TOTAL PROTEIN: 5.7 G/DL (ref 6.4–8.2)
TRIGL SERPL-MCNC: 161 MG/DL (ref 0–150)
TSH REFLEX FT4: 0.26 UIU/ML (ref 0.27–4.2)
VLDLC SERPL CALC-MCNC: 32 MG/DL
WBC # BLD: 6.5 K/UL (ref 4–11)

## 2022-04-03 PROCEDURE — 84443 ASSAY THYROID STIM HORMONE: CPT

## 2022-04-03 PROCEDURE — 97162 PT EVAL MOD COMPLEX 30 MIN: CPT

## 2022-04-03 PROCEDURE — 70551 MRI BRAIN STEM W/O DYE: CPT

## 2022-04-03 PROCEDURE — 99223 1ST HOSP IP/OBS HIGH 75: CPT | Performed by: INTERNAL MEDICINE

## 2022-04-03 PROCEDURE — 6360000002 HC RX W HCPCS: Performed by: INTERNAL MEDICINE

## 2022-04-03 PROCEDURE — 1200000000 HC SEMI PRIVATE

## 2022-04-03 PROCEDURE — 97166 OT EVAL MOD COMPLEX 45 MIN: CPT

## 2022-04-03 PROCEDURE — 36415 COLL VENOUS BLD VENIPUNCTURE: CPT

## 2022-04-03 PROCEDURE — 6370000000 HC RX 637 (ALT 250 FOR IP): Performed by: INTERNAL MEDICINE

## 2022-04-03 PROCEDURE — 97116 GAIT TRAINING THERAPY: CPT

## 2022-04-03 PROCEDURE — 80053 COMPREHEN METABOLIC PANEL: CPT

## 2022-04-03 PROCEDURE — 2580000003 HC RX 258: Performed by: INTERNAL MEDICINE

## 2022-04-03 PROCEDURE — 97530 THERAPEUTIC ACTIVITIES: CPT

## 2022-04-03 PROCEDURE — 85027 COMPLETE CBC AUTOMATED: CPT

## 2022-04-03 PROCEDURE — 84439 ASSAY OF FREE THYROXINE: CPT

## 2022-04-03 RX ADMIN — METOPROLOL TARTRATE 25 MG: 25 TABLET, FILM COATED ORAL at 21:30

## 2022-04-03 RX ADMIN — METOPROLOL TARTRATE 25 MG: 25 TABLET, FILM COATED ORAL at 11:48

## 2022-04-03 RX ADMIN — HEPARIN SODIUM 5000 UNITS: 5000 INJECTION INTRAVENOUS; SUBCUTANEOUS at 05:35

## 2022-04-03 RX ADMIN — OXYCODONE HYDROCHLORIDE 15 MG: 15 TABLET ORAL at 17:14

## 2022-04-03 RX ADMIN — DULOXETINE HYDROCHLORIDE 60 MG: 60 CAPSULE, DELAYED RELEASE ORAL at 08:42

## 2022-04-03 RX ADMIN — METRONIDAZOLE 500 MG: 250 TABLET ORAL at 14:36

## 2022-04-03 RX ADMIN — ASPIRIN 81 MG: 81 TABLET, COATED ORAL at 08:42

## 2022-04-03 RX ADMIN — OXYCODONE HYDROCHLORIDE 15 MG: 15 TABLET ORAL at 21:46

## 2022-04-03 RX ADMIN — METRONIDAZOLE 500 MG: 250 TABLET ORAL at 05:36

## 2022-04-03 RX ADMIN — CIPROFLOXACIN 500 MG: 500 TABLET, FILM COATED ORAL at 21:30

## 2022-04-03 RX ADMIN — CIPROFLOXACIN 500 MG: 500 TABLET, FILM COATED ORAL at 08:42

## 2022-04-03 RX ADMIN — HEPARIN SODIUM 5000 UNITS: 5000 INJECTION INTRAVENOUS; SUBCUTANEOUS at 14:37

## 2022-04-03 RX ADMIN — METRONIDAZOLE 500 MG: 250 TABLET ORAL at 21:30

## 2022-04-03 RX ADMIN — OXYCODONE HYDROCHLORIDE 15 MG: 15 TABLET ORAL at 05:44

## 2022-04-03 RX ADMIN — SODIUM CHLORIDE: 9 INJECTION, SOLUTION INTRAVENOUS at 17:15

## 2022-04-03 RX ADMIN — HEPARIN SODIUM 5000 UNITS: 5000 INJECTION INTRAVENOUS; SUBCUTANEOUS at 21:30

## 2022-04-03 RX ADMIN — ATORVASTATIN CALCIUM 80 MG: 80 TABLET, FILM COATED ORAL at 21:30

## 2022-04-03 RX ADMIN — SODIUM CHLORIDE: 9 INJECTION, SOLUTION INTRAVENOUS at 02:16

## 2022-04-03 RX ADMIN — OXYCODONE HYDROCHLORIDE 15 MG: 15 TABLET ORAL at 11:48

## 2022-04-03 ASSESSMENT — PAIN SCALES - GENERAL
PAINLEVEL_OUTOF10: 7
PAINLEVEL_OUTOF10: 0
PAINLEVEL_OUTOF10: 0
PAINLEVEL_OUTOF10: 8
PAINLEVEL_OUTOF10: 8
PAINLEVEL_OUTOF10: 6
PAINLEVEL_OUTOF10: 7

## 2022-04-03 ASSESSMENT — PAIN DESCRIPTION - ORIENTATION: ORIENTATION: UPPER;POSTERIOR

## 2022-04-03 ASSESSMENT — PAIN DESCRIPTION - DESCRIPTORS
DESCRIPTORS: ACHING;DISCOMFORT;THROBBING
DESCRIPTORS: ACHING

## 2022-04-03 ASSESSMENT — PAIN - FUNCTIONAL ASSESSMENT: PAIN_FUNCTIONAL_ASSESSMENT: ACTIVITIES ARE NOT PREVENTED

## 2022-04-03 ASSESSMENT — PAIN DESCRIPTION - FREQUENCY: FREQUENCY: CONTINUOUS

## 2022-04-03 ASSESSMENT — PAIN DESCRIPTION - LOCATION
LOCATION: NECK
LOCATION: NECK

## 2022-04-03 ASSESSMENT — PAIN DESCRIPTION - PAIN TYPE
TYPE: CHRONIC PAIN
TYPE: CHRONIC PAIN

## 2022-04-03 ASSESSMENT — PAIN DESCRIPTION - ONSET: ONSET: ON-GOING

## 2022-04-03 ASSESSMENT — PAIN DESCRIPTION - PROGRESSION: CLINICAL_PROGRESSION: NOT CHANGED

## 2022-04-03 NOTE — PROGRESS NOTES
Speech Language Pathology  ATTEMPT    Serafin Rickey  1955        SLP eval and treat orders received. Performed chart review and consulted with RN. RN reports patient is currently with PT/OT and thus not available. SLP will attempt again as schedule permits.        Thanks,  Arabella Chavez M.A., Junior Bhatia [address]  Speech-Language Pathologist

## 2022-04-03 NOTE — PROGRESS NOTES
Jennyfer served Dr. Eladio Ac:    Shasha Ochoa - called in the ophthalmology consult and the on-call physician called me back and was asking a bunch of questions that I did not have an answer for. She asked to speak to the referring doctor and I think she assumed I was the referring physician. It's Dr. Dai Judeg and her cell is [phone]. She stated she was going to see a couple patients at a clinic soon and if she didn't answer she would call you back. Thanks!       Awaiting response

## 2022-04-03 NOTE — PROGRESS NOTES
Physical Therapy    Facility/Department: [facility] - MED SURG  Initial Assessment/Treatment/Discharge    NAME: Cely Zuniga  : 1955  MRN: [id number]    Date of Service: 4/3/2022    Discharge Recommendations:  Home with assist PRN,Outpatient PT   PT Equipment Recommendations  Equipment Needed: No    Assessment   Body structures, Functions, Activity limitations: Decreased balance;Decreased vision/visual deficit; Decreased endurance  Assessment: Pt is a 74 y.o. male admitted to [facility] secondary to diploplia and dizziness with suspected CVA. Pt denies other dizziness/weakness. Pt lives with family in multi-level home with level entry with full flight to bed/bath. Pt is currently functioning at/near his baseline with mobility demonstrating bed mobility with MI, t/f with I and ambulation community distances without AD with S to I without LOB. Pt completes stair activities with S. Recommend pt d/c home with assist PRN. Pt reports hx of cervical spine problems, noted weakness in RUE as well as concerns with current vision problems. Recommend OP PT to address cervical spine problems (pt reports wanting to decrease need of injections with pain clinic) as well as for possible higher level balance/vestibular tasks (see OT note for recommendation of OP visual therapy). Pt with multiple questions regarding vision/CVA as well as regarding difficulties with RUE and cervical spine issues, education provided. No further acute PT indcated at this time as pt is functioning at/near baseline level with functional mobility and gait. Treatment Diagnosis: Impaired balance and gait  Prognosis: Good  Decision Making: Medium Complexity  PT Education: Goals; General Safety;Gait Training;PT Role;Disease Specific Education;Plan of Care; Functional Mobility Training;Precautions; Injury Prevention;Transfer Training  Patient Education: Educated in safety with mobility and progression of activity.  Recommended follow up with ortho to get updated brace for LLE as well as discussed OP PT for cervical spine issues/UE weakness and balance tasks. Education provided of benefit of vision therapy d/t diploplia  Barriers to Learning: none  REQUIRES PT FOLLOW UP: No  Activity Tolerance  Activity Tolerance: Patient Tolerated treatment well       Patient Diagnosis(es): There were no encounter diagnoses. has a past medical history of Arthritis and Cerebral artery occlusion with cerebral infarction ([address] has a past surgical history that includes Ankle surgery (Left); Colonoscopy (11/20/2007); Colonoscopy (05 Jan 2017); back surgery; fracture surgery; and joint replacement. Restrictions  Restrictions/Precautions  Restrictions/Precautions: Fall Risk,General Precautions  Position Activity Restriction  Other position/activity restrictions: IV, telemetry  Vision/Hearing  Vision: Impaired  Vision Exceptions: Wears glasses for reading (current double vision)  Hearing: Within functional limits     Subjective  General  Chart Reviewed: Yes  Patient assessed for rehabilitation services?: Yes  Response To Previous Treatment: Not applicable  Family / Caregiver Present: No  Referring Practitioner: Simona Ugarte MD  Referral Date : 04/03/22  Diagnosis: Diploplia, dizziness (suspected CVA), a-fib RVR  Follows Commands: Within Functional Limits  General Comment  Comments: RN cleared pt for session  Subjective  Subjective: Pt resting in bed on approach, pleasant and agreeable to PT evaluation  Pain Screening  Patient Currently in Pain: Yes  Pain Assessment  Pain Assessment: 0-10  Pain Level: 7  Pain Type: Chronic pain  Pain Location: Neck  Pain Descriptors: Aching  Non-Pharmaceutical Pain Intervention(s): Ambulation/Increased Activity;Repositioned; Therapeutic presence;Distraction  Vital Signs  Pulse: 69  Heart Rate Source: Monitor  BP: (!) 149/93  BP Location: Right Arm  Patient Position: Semi fowlers  Patient Currently in Pain: Yes  Oxygen Therapy  SpO2: 99 %  O2 Device: None (Room air)       Orientation  Orientation  Overall Orientation Status: Within Normal Limits  Social/Functional History  Social/Functional History  Lives With: Spouse,Son (35 y.o. son)  Type of Home: House  Home Layout: Two level (Walk-out basement with bed/bath)  Home Access: Level entry (Level entry into basement, full flight up to main floor with bed/bath with RHR)  Bathroom Shower/Tub: Walk-in shower  Bathroom Toilet: Standard  Bathroom Equipment: Built-in shower seat  Home Equipment: Cane,Roll About  ADL Assistance: Independent  Homemaking Responsibilities: Yes (shares with family)  Ambulation Assistance: Independent (Without AD, intermittent use of cane with foot pain d/t previous injury)  Transfer Assistance: Independent  Active : Yes  Occupation: Full time employment  Type of occupation: Self-empolyed, 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  Additional Comments: Wife and son work during the day, denies recent hx of falls with injury    Objective     Observation/Palpation  Posture: Good    AROM RLE (degrees)  RLE AROM: WFL  AROM LLE (degrees)  LLE AROM : WFL  LLE General AROM: Grossly WFL, hx L ankle/heel injury s/p sx, limited ROM to ankle     Strength RLE  Strength RLE: WFL  Strength LLE  Strength LLE: WFL  Tone RLE  RLE Tone: Normotonic  Tone LLE  LLE Tone: Normotonic  Motor Control  Gross Motor?: WFL     Sensation  Overall Sensation Status: WFL (denies N/T)     Bed mobility  Supine to Sit: Modified independent  Sit to Supine: Modified independent  Comment: HOB elevated     Transfers  Sit to Stand: Independent  Stand to sit: Independent  Comment: Without AD     Ambulation  Ambulation?: Yes  Ambulation 1  Surface: level tile  Device: No Device  Assistance: Supervision;Modified Independent  Quality of Gait: Partial step through pattern, L decreased WS and stance, L ER at hip (reports baseline d/t old L ankle/heel injury). Pt steady without overt LOB  Gait Deviations: Slow Danyelle; Increased RADHA; Decreased step length;Decreased step height  Distance: 200'  Comments: Pt completes multiple turns throughout without overt LOB  Stairs/Curb  Stairs?: Yes  Stairs  # Steps : 5  Stairs Height: 6\"  Rails: Right ascending  Device: No Device  Assistance: Supervision  Comment: Pt completes with reciprocal pattern, no LOB 3 (4\") + 2 (6\"), S for safety and line managment        Balance  Posture: Good  Sitting - Static: Good  Sitting - Dynamic: Good  Standing - Static: Good;-  Standing - Dynamic: Fair;+  Comments: S to I without AD        Plan   Plan  Times per week: d/c  Current Treatment Recommendations: Strengthening,Neuromuscular Re-education,Home Exercise Program,Manual Therapy - Soft Tissue Mobilization,Safety Education & Training,Balance Training,Endurance Training,Patient/Caregiver Education & Training,Functional Mobility Training,Transfer Training,Gait Training,Stair training,Positioning  Safety Devices  Type of devices: All fall risk precautions in place,Call light within reach,Nurse notified,Gait belt,Patient at risk for falls      AM-PAC Score  AM-PAC Inpatient Mobility Raw Score : 24 (04/03/22 1548)  AM-PAC Inpatient T-Scale Score : 61.14 (04/03/22 1548)  Mobility Inpatient CMS 0-100% Score: 0 (04/03/22 1548)  Mobility Inpatient CMS G-Code Modifier : [facility] (04/03/22 1548)          Goals  Short term goals  Time Frame for Short term goals: 1 visit 4/03  Short term goal 1: Pt will demonstrate functional t/f with I -- 4/03: GOAL MET I no AD  Short term goal 2: Pt will ambulate >150' without AD with S to I -- 4/03: GOAL MET S to I 200' no AD  Patient Goals   Patient goals :  \"Get my vision improved and go home\"       Therapy Time   Individual Concurrent Group Co-treatment   Time In 1154         Time Out 1230         Minutes 36         Timed Code Treatment Minutes: 26 Minutes (10 minutes for eval)       Ti Hollis, PT, DPT

## 2022-04-03 NOTE — CONSULTS
Consult placed    Who:BEULAH, [address]  Date:4/3/2022,  Time:7:47 AM        Electronically signed by Linda Morales on 4/3/2022 at 7:47 AM

## 2022-04-03 NOTE — CONSULTS
[facility]  [phone]        Reason for Consultation/Chief Complaint: \"I have been asked to see him for afib RVR . \"    History of Present Illness:  Hailee George is a 74 y.o. patient who presented to the hospital with complaints of  Dizziness fatigue blurred vision and later on double vision ( side to side). This happened on 4.1.22. later that day he went to ED DX  Atrial fib. He is not aware of this Dx before no prior heart disease. He did not pass out. No falls. His double vision persists. No chest pain no SOB denies palpitations. At the emergency department, there was a suspicion for an acute CVA. Stroke team was called. tPA was not administered due to patient being out of time window. Aspirin was started and patient was transferred to our hospital for admission. Also noted that patient had leukocytosis. Empiric antibiotic a single dose was given. No fever, no chills. Diagnosed with atrial fibrillation, which seems to be something new. Patient states that last week he noticed another episode of diverticulitis that he had before. Switched to clear liquid on his own and noted some improvement couple days ago. However, he has not had any bowel movements and still was not able to eat much. The episode of suspected diverticulitis started with nausea and vomiting. Since then, oral intake has been reduced. No other accompanying symptoms  . I have been asked to provide consultation regarding further management and testing. Past Medical History:   has a past medical history of Arthritis and Cerebral artery occlusion with cerebral infarction ([facility] [address] Surgical History:   has a past surgical history that includes Ankle surgery (Left); Colonoscopy (11/20/2007); Colonoscopy (05 Jan 2017); back surgery; fracture surgery; and joint replacement. Social History:   reports that he has been smoking. He started smoking about 39 years ago.  He has a 40.00 pack-year smoking history. He has never used smokeless tobacco. He reports current drug use. He reports that he does not drink alcohol. Family History:  family history includes Cancer in his maternal grandmother; Other in his mother. Home Medications:  Were reviewed and are listed in nursing record. and/or listed below  Prior to Admission medications    Medication Sig Start Date End Date Taking? Authorizing Provider   DULoxetine (CYMBALTA) 60 MG extended release capsule Take 60 mg by mouth daily 11/26/21   Historical Provider, MD   oxyCODONE (OXY-IR) 15 MG immediate release tablet Take 15 mg by mouth 4 times daily as needed. 10/5/21   Historical Provider, MD        Allergies:  Patient has no known allergies.      Review of Systems:   12 point ROS negative in all areas as listed below except as in [place]  Constitutional, EENT, pulmonary, GI, , Musculoskeletal, skin,  hematological, endocrine, Psychiatric    Physical Examination:    Vitals:    04/03/22 0826   BP: 133/83   Pulse: 65   Resp: 18   Temp: 97.9 °F (36.6 °C)   SpO2: 99%    Weight: 184 lb 12.8 oz (83.8 kg)       No orthostatic BP drop   General Appearance:  Alert, cooperative, no distress, appears stated age   Head:  Normocephalic, without obvious abnormality, atraumatic   Eyes:  PERRL, conjunctiva/corneas clear       Nose: Nares normal, no drainage or sinus tenderness   Throat: Lips, mucosa, and tongue normal   Neck: Supple, symmetrical, trachea midline, no adenopathy, thyroid: not enlarged, symmetric, no tenderness/mass/nodules, no carotid bruit or JVD       Lungs:   Clear to auscultation bilaterally, respirations unlabored   Chest Wall:  No tenderness or deformity   Heart:  Regular rate and rhythm, S1, S2 normal, no murmur, rub or gallop   Abdomen:   Soft, non-tender, bowel sounds active all four quadrants,  no masses, no organomegaly           Extremities: Extremities normal, atraumatic, no cyanosis or edema   Pulses: 2+ and symmetric   Skin: Skin color, texture, turgor normal, no rashes or lesions   Pysch: Normal mood and affect   Neurologic: Normal gross motor and sensory exam.         Labs  CBC:   Lab Results   Component Value Date    WBC 6.5 04/03/2022    RBC 4.70 04/03/2022    HGB 13.5 04/03/2022    HCT 40.9 04/03/2022    MCV 87.0 04/03/2022    RDW 14.6 04/03/2022     04/03/2022     CMP:    Lab Results   Component Value Date     04/03/2022    K 4.0 04/03/2022     04/03/2022    CO2 26 04/03/2022    BUN 18 04/03/2022    CREATININE 1.0 04/03/2022    GFRAA >60 04/03/2022    AGRATIO 1.6 04/03/2022    LABGLOM >60 04/03/2022    GLUCOSE 97 04/03/2022    PROT 5.7 04/03/2022    CALCIUM 8.5 04/03/2022    BILITOT 0.4 04/03/2022    ALKPHOS 72 04/03/2022    AST 16 04/03/2022    ALT 13 04/03/2022     PT/INR:  No results found for: PTINR  Lab Results   Component Value Date    TROPONINI <0.01 04/02/2022       EKG:  I have reviewed EKG with the following interpretation:  Impression: Atrial fibrillation with rapid ventricular responseST & T wave abnormality, consider inferolateral ischemiaAbnormal ECGWhen compared with ECG of 11-DEC-2008 17:21,Atrial fibrillation has replaced Sinus rhythmVent. rate has increased BY  50 BPMNon-specific change in ST segment in Inferior leadsST now depressed in Lateral leadsT wave inversion now evident in Inferior leadsConfirmed by Geraldine Officer ([id number]) on 4/2/2022 7:01:11 PM   Xray Chest 4.2.22     FINDINGS:   Clear lungs. No pleural effusion or pneumothorax. Cardiomediastinal   silhouette is unremarkable. Visualized osseous structures are unremarkable.           Impression   Clear lungs.          Assessment  Diplopia no loss of visual field suspect brain stem infarct does not seem like Myasthenia   afib RVR resolved  Smoker  Decayed teeth  Recent diverticulitis colon  Chronic neck pain    Plan   echo doppler of heart TSH start him on beta blockers and asa  Check lipids   MRI brain  Neuro consult    Patient Active Problem List   Diagnosis

## 2022-04-03 NOTE — PROGRESS NOTES
TODAYS DATE:  4/3/2022    Discussed personal risk factors for Stroke /TIA with patient/family, and ways to reduce the risk for a recurrent stroke. Patient's personal risk factors which were identified are:     [] Alcohol Abuse: check with your physician before any alcohol consumption. [x] Atrial fibrillation: may cause blood clots. [] Drug Abuse: Seek help, talk with your doctor  [] Clotting Disorder  [] Diabetes  [] Family history of stroke or heart disease  [] High Blood Pressure/Hypertension: work with your physician. [x] High cholesterol: monitor cholesterol levels with your physician.   [] Overweight/Obesity: work with your physician for your ideal body weight.  [] Physical Inactivity: get regular exercise as directed by your physician. [] Personal history of previous TIA or stroke  [x] Poor Diet; decrease salt (sodium) in your diet, follow diet directed by physician. [] Smoking: Cigarette/Cigar: stop smoking. Reviewed the Following Education with Patient and/or Family:   -Signs and Symptoms of Stroke:     (facial droop, weakness/numbness especially on one side, speech difficulty, sudden confusion, sudden loss of vision, sudden severe headache,       sudden loss of balance or having difficulty walking, syncope or seizure)  -How to activate EMS (911)   -Importance of Follow Up Appointment at Discharge   -Importance of Compliance with Medications Prescribed at Discharge     Pt verbalized understanding.      Family Present during Education: yes     Stroke Education Booklet at Bedside: yes     Electronically signed by Tirso Orozco RN on 4/3/2022 at 5:41 AM

## 2022-04-03 NOTE — PROGRESS NOTES
Hospitalist Progress Note  4/3/2022 12:09 PM  Subjective:   Admit Date: 4/2/2022  PCP: Carlos Barakat MD Status: Inpatient  Interval History: Hospital Day: 2, admitted with diplopia and dizziness with suspected CVA. Stroke team alerted, outside window for tPA. Newly diagnosed atrial fibrillation with rapid ventricular response initially treated with IV diltiazem. History of present illness: Last week he noticed another episode of diverticulitis that he had before. Switched to clear liquid on his own and noted some improvement couple days ago. However, he has not had any bowel movements and still was not able to eat much. The episode of suspected diverticulitis started with nausea and vomiting. Since then, oral intake has been reduced. Diet: regular  Medications:   Sodium chloride at 75 ml/hr  metoprolol tartrate  25 mg Oral BID   duloxetine  60 mg Oral Daily   aspirin  81 mg Oral Daily   atorvastatin  80 mg Oral Nightly   heparin   5,000 Units SubCUTAneous TID   ciprofloxacin  500 mg Oral BID (4/2, day #2)   metronidazole  500 mg Oral TID (4/2, day #2)     Recent Labs     04/02/22  0011 04/03/22  0811   WBC 14.5* 6.5   HGB 17.2 13.5    208   MCV 86.3 87.0     Recent Labs     04/02/22  0011 04/02/22  1205 04/03/22  0811   * 136 143   K 4.0 3.7 4.0   CL 96* 103 108   CO2 25 26 26   BUN 31* 24* 18   CREATININE 1.5* 1.1 1.0   GLUCOSE 114* 92 97     Recent Labs     04/02/22  0011 04/03/22  0811   AST 27 16   ALT 16 13   BILITOT 0.8 0.4   ALKPHOS 103 72     Troponin T:  Negative x 3  INR:  1.00    TSH (4/2) 1.46 uIU/mL  Procalcitonin (4/2) 0.05 ng/mL  Hemoglobin A1c (4/2) 5.6%  SARS-CoV-2 RT PCR (4/2) not detected  Lactate (4/2) 1.5    MRI brain (4/3) No acute cortical infarct or other acute intracranial process. Noncontrast CT head (4/2) No acute intracranial abnormality. CTA head / neck (4/2) No flow-limiting arterial stenosis in the head and neck.     CT abd / pelvis w/o contrast (4/2) Scattered subtle injection of the fat in the central mesentery and in the periaortic region of uncertain etiology.  This may simply be due to an early finding of fluid overload rather than postinflammatory-infectious change. Recommend follow-up imaging with IV contrast for further characterization  No cholecystitis. Moderate stool in colon.  A few colonic diverticula are seen.  No pericolonic fat stranding.  Appendix is identified and normal caliber. Objective:   Vitals:  BP (!) 149/93   Pulse 69   Temp 98.2 °F (36.8 °C) (Oral)   Resp 18   Ht 6' 1\" (1.854 m)   Wt 184 lb 12.8 oz (83.8 kg)   SpO2 99%   BMI 24.38 kg/m²   General appearance: alert and cooperative with exam  Lungs: clear to auscultation bilaterally  Heart: regular rate and rhythm, S1, S2 normal, no murmur, click, rub or gallop  Abdomen:  Lower abdominal tenderness, benign abdomen, audible bowel sounds  Extremities: extremities normal, atraumatic, no cyanosis or edema  Neurologic: No obvious focal neurologic deficits. CN II-XII grossly  Intact. Double vision resolves if he closes his right eye. Assessment and Plan:   1. Diplopia with suspected but not diagnosed CVA. MRI brain negative. Neurology evaluation pending. Resolves with closing right eye. Ophthalmology evaluation. 2. New onset atrial fibrillation, initially rapid ventricular response (HR 150s) on diltiazem gtt, discontinued. TSH and troponin normal.  Echo pending. Rate control with metoprolol tartrate 25 mg BID. Started on aspirin by cardiology. 3. Suspected acute diverticulitis on ciprofloxacin and metronidazole. CT scan with oral contrast only due to MAXIMILIANO and contrast with CTA head and neck. 4. Acute kidney injury:  Creatinine decreased toward baseline 0.8. Likely prerenal azotemia treated with IV saline. Poor intake over the past week.      Advance Directive: Full Code  DVT prophylaxis with heparin sub-Q TID (renal dosing)  Discharge planning: pending neurology and cardiology evaluations, likely Monday, April 4      Rolanda Gaytan [id number], MD  Rounding Hospitalist

## 2022-04-03 NOTE — PROGRESS NOTES
Occupational Therapy   Occupational Therapy Initial/discharge Assessment  1 x only    Date: 4/3/2022   Patient Name: Lisa Wilson  MRN: [id number]     : 1955    Date of Service: 4/3/2022    Discharge Recommendations:  Outpatient OT (No Driving, No working with power tools/equipment at this time)       Assessment   Performance deficits / Impairments: Decreased vision/visual deficit  Assessment: pt normally independent with IADL's without AD, now reports double vision worse in Right eye, independent with BADL's & bathroom mobility, however Out patient therapy services with vision specialist recommended  OT Education: OT Role;Precautions;IADL Safety  Patient Education: disease specific: importance of follow up with Opthalmologis/ out patient therapy services for vision, No Driving or working with power equipment/tools  Barriers to Learning: safety  No Skilled OT: Independent with ADL's  REQUIRES OT FOLLOW UP: Yes  Activity Tolerance  Activity Tolerance: Patient Tolerated treatment well  Safety Devices  Safety Devices in place: Yes  Type of devices: Call light within reach; Left in bed;Nurse notified           Patient Diagnosis(es): There were no encounter diagnoses. has a past medical history of Arthritis and Cerebral artery occlusion with cerebral infarction ([facility] [address] has a past surgical history that includes Ankle surgery (Left); Colonoscopy (2007); Colonoscopy (2017); back surgery; fracture surgery; and joint replacement.            Restrictions  Restrictions/Precautions  Restrictions/Precautions: Fall Risk,General Precautions  Position Activity Restriction  Other position/activity restrictions: IV, telemetry    Subjective   General  Chart Reviewed: Yes  Patient assessed for rehabilitation services?: Yes  Family / Caregiver Present: No  Referring Practitioner: Dr. Cirilo Prince  Diagnosis: decreased vision/double vision Right  General Comment  Comments: RN cleared pt for OT eval; pt awake in bed, agreeable to therapy    Social/Functional History  Social/Functional History  Lives With: Spouse,Son (35 y.o. son)  Home Layout: Two level (Walk-out basement with bed/bath)  Home Access: Level entry (Level entry into basement, full flight up to main floor with bed/bath with RHR)  Bathroom Shower/Tub: Walk-in shower  Bathroom Toilet: Standard  Bathroom Equipment: Built-in shower seat  Home Equipment: [employer]  ADL Assistance: Independent  Homemaking Responsibilities: Yes (shares with family)  Ambulation Assistance: Independent (Without AD, intermittent use of cane with foot pain d/t previous injury)  Transfer Assistance: Independent  Active : Yes  Occupation: Full time employment  Type of occupation: Self-empolyed, 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  Additional Comments: Wife and son work during the day, denies recent hx of falls with injury       Objective   Vision: Impaired  Vision Exceptions: Wears glasses for reading  Hearing: Within functional limits    Orientation  Overall Orientation Status: Within Normal Limits     Balance  Sitting Balance: Independent  Standing Balance: Supervision (without AD due to IV pole)  Standing Balance  Time: 2 minutes x 1  Activity: functional mobility in hallway  Functional Mobility  Functional - Mobility Device: No device  Assist Level: Supervision  Toilet Transfers  Toilet - Technique: Ambulating (independently without AD per pt & nurse report)  Toilet Transfer: Independent  ADL  Feeding: Independent  LE Dressing: Independent  Toileting: Independent    RUE Tone: Normotonic  LUE Tone: Normotonic  Coordination  Movements Are Fluid And Coordinated: Yes     Bed mobility  Supine to Sit: Independent  Sit to Supine: Independent  Scooting: Independent  Transfers  Sit to stand: Independent  Stand to sit:  Independent  Vision - Basic Assessment  Prior Vision: Wears glasses only for reading  Patient Visual Report: Difficulty maintaining concentration with focus;Diplopia  Cognition  Overall Cognitive Status: WFL      LUE AROM : WFL  RUE AROM : WFL             Plan   OT eval only for in patient OT services      AM-PAC Score  Self care score = 23; G-code = CI  Goals  Patient Goals   Patient goals : 'find out what I can do about my vision\"       Therapy Time   Individual Concurrent Group Co-treatment   Time In 1154         Time Out 1230         Minutes 1015 [facility]

## 2022-04-04 LAB
LV EF: 48 %
LVEF MODALITY: NORMAL

## 2022-04-04 PROCEDURE — 99223 1ST HOSP IP/OBS HIGH 75: CPT | Performed by: PSYCHIATRY & NEUROLOGY

## 2022-04-04 PROCEDURE — 2580000003 HC RX 258: Performed by: INTERNAL MEDICINE

## 2022-04-04 PROCEDURE — 93306 TTE W/DOPPLER COMPLETE: CPT

## 2022-04-04 PROCEDURE — 6370000000 HC RX 637 (ALT 250 FOR IP): Performed by: INTERNAL MEDICINE

## 2022-04-04 PROCEDURE — 1200000000 HC SEMI PRIVATE

## 2022-04-04 PROCEDURE — 6370000000 HC RX 637 (ALT 250 FOR IP): Performed by: PHYSICIAN ASSISTANT

## 2022-04-04 PROCEDURE — 99233 SBSQ HOSP IP/OBS HIGH 50: CPT | Performed by: INTERNAL MEDICINE

## 2022-04-04 PROCEDURE — 92610 EVALUATE SWALLOWING FUNCTION: CPT

## 2022-04-04 PROCEDURE — 6360000002 HC RX W HCPCS: Performed by: INTERNAL MEDICINE

## 2022-04-04 RX ORDER — LANOLIN ALCOHOL/MO/W.PET/CERES
10.5 CREAM (GRAM) TOPICAL NIGHTLY
Status: DISCONTINUED | OUTPATIENT
Start: 2022-04-04 | End: 2022-04-06 | Stop reason: HOSPADM

## 2022-04-04 RX ORDER — MECOBALAMIN 5000 MCG
10 TABLET,DISINTEGRATING ORAL NIGHTLY
Status: DISCONTINUED | OUTPATIENT
Start: 2022-04-04 | End: 2022-04-04

## 2022-04-04 RX ADMIN — METRONIDAZOLE 500 MG: 250 TABLET ORAL at 20:21

## 2022-04-04 RX ADMIN — METRONIDAZOLE 500 MG: 250 TABLET ORAL at 14:22

## 2022-04-04 RX ADMIN — ATORVASTATIN CALCIUM 80 MG: 80 TABLET, FILM COATED ORAL at 20:21

## 2022-04-04 RX ADMIN — CIPROFLOXACIN 500 MG: 500 TABLET, FILM COATED ORAL at 08:36

## 2022-04-04 RX ADMIN — SODIUM CHLORIDE: 9 INJECTION, SOLUTION INTRAVENOUS at 10:58

## 2022-04-04 RX ADMIN — METOPROLOL TARTRATE 25 MG: 25 TABLET, FILM COATED ORAL at 20:22

## 2022-04-04 RX ADMIN — OXYCODONE HYDROCHLORIDE 15 MG: 15 TABLET ORAL at 21:38

## 2022-04-04 RX ADMIN — DULOXETINE HYDROCHLORIDE 60 MG: 60 CAPSULE, DELAYED RELEASE ORAL at 08:36

## 2022-04-04 RX ADMIN — METRONIDAZOLE 500 MG: 250 TABLET ORAL at 05:52

## 2022-04-04 RX ADMIN — OXYCODONE HYDROCHLORIDE 15 MG: 15 TABLET ORAL at 06:03

## 2022-04-04 RX ADMIN — HEPARIN SODIUM 5000 UNITS: 5000 INJECTION INTRAVENOUS; SUBCUTANEOUS at 14:22

## 2022-04-04 RX ADMIN — HEPARIN SODIUM 5000 UNITS: 5000 INJECTION INTRAVENOUS; SUBCUTANEOUS at 05:52

## 2022-04-04 RX ADMIN — ASPIRIN 81 MG: 81 TABLET, COATED ORAL at 08:37

## 2022-04-04 RX ADMIN — CIPROFLOXACIN 500 MG: 500 TABLET, FILM COATED ORAL at 20:21

## 2022-04-04 RX ADMIN — OXYCODONE HYDROCHLORIDE 15 MG: 15 TABLET ORAL at 16:44

## 2022-04-04 RX ADMIN — OXYCODONE HYDROCHLORIDE 15 MG: 15 TABLET ORAL at 10:57

## 2022-04-04 ASSESSMENT — PAIN DESCRIPTION - PROGRESSION
CLINICAL_PROGRESSION: NOT CHANGED
CLINICAL_PROGRESSION: GRADUALLY IMPROVING
CLINICAL_PROGRESSION: NOT CHANGED

## 2022-04-04 ASSESSMENT — PAIN DESCRIPTION - LOCATION
LOCATION: NECK
LOCATION: NECK
LOCATION: NECK;FOOT

## 2022-04-04 ASSESSMENT — PAIN SCALES - GENERAL
PAINLEVEL_OUTOF10: 0
PAINLEVEL_OUTOF10: 10
PAINLEVEL_OUTOF10: 6
PAINLEVEL_OUTOF10: 6
PAINLEVEL_OUTOF10: 2
PAINLEVEL_OUTOF10: 10
PAINLEVEL_OUTOF10: 6
PAINLEVEL_OUTOF10: 7
PAINLEVEL_OUTOF10: 6

## 2022-04-04 ASSESSMENT — PAIN DESCRIPTION - ORIENTATION
ORIENTATION: UPPER;POSTERIOR
ORIENTATION: LEFT

## 2022-04-04 ASSESSMENT — PAIN DESCRIPTION - DESCRIPTORS: DESCRIPTORS: ACHING;DISCOMFORT

## 2022-04-04 ASSESSMENT — PAIN - FUNCTIONAL ASSESSMENT: PAIN_FUNCTIONAL_ASSESSMENT: ACTIVITIES ARE NOT PREVENTED

## 2022-04-04 ASSESSMENT — PAIN DESCRIPTION - PAIN TYPE
TYPE: ACUTE PAIN
TYPE: CHRONIC PAIN
TYPE: CHRONIC PAIN

## 2022-04-04 ASSESSMENT — PAIN DESCRIPTION - FREQUENCY: FREQUENCY: CONTINUOUS

## 2022-04-04 ASSESSMENT — PAIN DESCRIPTION - ONSET: ONSET: ON-GOING

## 2022-04-04 NOTE — PROGRESS NOTES
[facility]   Daily Cardiovascular Progress Note    Admit Date: 4/2/2022    CC: AFIB    HPI: Silvia Ingram has no complaints today.       Medications/Labs all Reviewed:  Patient Active Problem List   Diagnosis    Left ankle pain    Ankle arthritis    Acute cerebrovascular accident (CVA) ([facility] [address]    Diplopia    Paroxysmal atrial fibrillation (HCC)       Medications:   metoprolol tartrate  25 mg Oral BID    DULoxetine  60 mg Oral Daily    aspirin  81 mg Oral Daily    Or    aspirin  300 mg Rectal Daily    atorvastatin  80 mg Oral Nightly    heparin (porcine)  5,000 Units SubCUTAneous 3 times per day    ciprofloxacin  500 mg Oral 2 times per day    metroNIDAZOLE  500 mg Oral 3 times per day       Infusion Medications:   sodium chloride 75 mL/hr at 04/03/22 1715    dilTIAZem Stopped (04/03/22 0214)       Labs:  Lab Results   Component Value Date    WBC 6.5 04/03/2022    HGB 13.5 04/03/2022    HCT 40.9 04/03/2022    MCV 87.0 04/03/2022     04/03/2022     Lab Results   Component Value Date    CREATININE 1.0 04/03/2022    BUN 18 04/03/2022     04/03/2022    K 4.0 04/03/2022     04/03/2022    CO2 26 04/03/2022     Lab Results   Component Value Date    INR 1.00 04/02/2022    PROTIME 11.3 04/02/2022        Physical Examination:    /66   Pulse 57   Temp 98.2 °F (36.8 °C) (Oral)   Resp 16   Ht 6' 1\" (1.854 m)   Wt 184 lb 12.8 oz (83.8 kg)   SpO2 98%   BMI 24.38 kg/m²    Wt Readings from Last 3 Encounters:   04/02/22 184 lb 12.8 oz (83.8 kg)   04/02/22 200 lb (90.7 kg)   01/05/17 200 lb (90.7 kg)       Intake/Output Summary (Last 24 hours) at 4/4/2022 1046  Last data filed at 4/4/2022 1017  Gross per 24 hour   Intake 720 ml   Output 3575 ml   Net -2855 ml       Respiratory:  · Resp Assessment: Normal respiratory effort  · Resp Auscultation: Clear to auscultation bilaterally   Cardiovascular:  · Auscultation: irregular rhythm and normal rate, normal S1S2, no murmur, rub or gallop  · Palpation:  Nl PMI  · JVP:  normal  · Extremities: No Edema  Abdomen:  · Soft, non-tender  · Normal bowel sounds  Extremities:  ·  No Cyanosis or Clubbing  Neurological/Psychiatric:  · Oriented to time, place, and person  · Non-anxious  Skin Warm and dry    Assessment:    Principal Problem:    Acute cerebrovascular accident (CVA) ([facility] [address]  Active Problems:    Diplopia    Paroxysmal atrial fibrillation ([address]  Resolved Problems:    * No resolved hospital problems. *    Plan:  Echocardiogram pending  AFIB controlled   ~start oral AC when cleared by neurology        All questions and concerns were addressed to the patient/family. Alternatives to my treatment were discussed. The note was completed using EMR. Every effort was made to ensure accuracy; however, inadvertent computerized transcription errors may be present.     Mario Alberto Hanson MD, MARTINEZ, [facility], [address] [place] [address]  4/4/2022 10:46 AM

## 2022-04-04 NOTE — PLAN OF CARE
Problem: COMMUNICATION IMPAIRMENT  Goal: Ability to express needs and understand communication  4/4/2022 1616 by Ezequiel Kramer RN  Outcome: Ongoing  4/4/2022 1615 by Ezequiel Kramer RN  Outcome: Ongoing   Pt. Verbalizes understanding   Problem: Pain:  Goal: Pain level will decrease  Description: Pain level will decrease  4/4/2022 1616 by Ezequiel Kramer RN  Outcome: Ongoing  4/4/2022 1615 by Ezequiel Kramer RN  Outcome: Ongoing   Pt. Verbalizes understanding  Problem: Falls - Risk of:  Goal: Will remain free from falls  Description: Will remain free from falls  4/4/2022 1616 by zEequiel Kramer RN  Outcome: Ongoing  4/4/2022 1615 by Ezequiel Kramer RN  Outcome: Ongoing   Pt. Verbalizes understanding  Problem: Nutrition  Intervention: Swallowing evaluation  4/4/2022 1217 by Raysa Johnson SLP  Note: Bedside swallow evaluation completed this date. Pt. Verbalizes understanding  Raysa Johnson, M.S. [address] [facility]  Speech-language pathologist  [id number]         Problem: Nutrition  Intervention: Aspiration precautions  4/4/2022 1217 by DIPAK Pham  Note: Bedside swallow evaluation completed this date.   Gael Quiroz understanding  Raysa Johnson, M.S. [address] [facility]  Speech-language pathologist  [phone]

## 2022-04-04 NOTE — CARE COORDINATION
CASE MANAGEMENT INITIAL ASSESSMENT      Reviewed chart and completed assessment with patient at beside  Family present:  No   Explained Case Management role/services. yes    Health Care Decision Maker :   Primary Decision Maker: Arlette Robles - [phone]    Secondary Decision Maker: Graeme Payton - Parent - [phone]        Admit date/status: IPA 4/2/22  Diagnosis: acute CVA     Is this a Readmission?:  No      Insurance: Medicare. [facility] secondary     Precert required for SNF: No       3 night stay required: No    Living arrangements, Adls, care needs, prior to admission: lives with spouse. IPTA. Active     Durable Medical Equipment at home:  none    Services in the home and/or outpatient, prior to admission: none    Current PCP: Dr Harman Barnett    Transportation needs:  Pt states family can provide     PT/OT recs: home assist prn    Hospital Exemption Notification (HEN): na    Barriers to discharge: none    Plan/comments: pt intends to dc home without needs. Please consult CM team if needs arise.      Rupal Wolfe RN

## 2022-04-04 NOTE — PLAN OF CARE
Problem: Nutrition  Intervention: Swallowing evaluation  Note: Bedside swallow evaluation completed this date. DEION Cerda  Speech-language pathologist  [id number]      Intervention: Aspiration precautions  Note: Bedside swallow evaluation completed this date.     Binh Montoya M.S. Sapphire Francis  Speech-language pathologist  [phone]

## 2022-04-04 NOTE — CONSULTS
In patient Neurology consult        [facility] Neurology      MD Jermain Collier Aleida  1955    Date of Service: 4/4/2022    Referring Physician: Callie Styles MD      Reason for the consult and CC: Acute diplopia    HPI:   The patient is a 74 y.o.  male, with a PMH of tobacco dependence, who presented to [facility] with acute dizziness and diplopia. The patient was outside cutting wood, when he suddenly became very weak and felt as though he was going to pass out, although he never lost consciousness. He also described double vision in his right eye, which lasted around 24 hours. His symptoms were severe. He has never had anything like this before. He denies fever, chills, headache,dysarthria, dysphagia, tinnitus, focal weakness, and paraesthesias. He was found to be in A. Fib with RVR in the ED. At the time of my exam, his neurological symptoms have all resolved.           Family History   Problem Relation Age of Onset    Other Mother         whipple procedure for what they thought was pancreatic tumor-- no tumor found    Cancer Maternal Grandmother         colon cancer - age late 63's     Past Surgical History:   Procedure Laterality Date    ANKLE SURGERY Left     surgeries x3- 2007,2008,2011    BACK SURGERY      COLONOSCOPY  11/20/2007    WNL    COLONOSCOPY  05 Jan 2017    diverticulosis    FRACTURE SURGERY      JOINT REPLACEMENT          Past Medical History:   Diagnosis Date    Arthritis     Cerebral artery occlusion with cerebral infarction [facility])      Social History     Tobacco Use    Smoking status: Current Every Day Smoker     Packs/day: 1.00     Years: 40.00     Pack years: 40.00     Start date: 4/6/1983    Smokeless tobacco: Never Used   Vaping Use    Vaping Use: Never used   Substance Use Topics    Alcohol use: No     Comment: very rarely    Drug use: Yes     Comment: percocet for chronic ankle pain- ordered by physician     No Known Allergies  Current Facility-Administered Medications   Medication Dose Route Frequency Provider Last Rate Last Admin    metoprolol tartrate (LOPRESSOR) tablet 25 mg  25 mg Oral BID Priscilla Cole MD   25 mg at 04/03/22 2130    DULoxetine (CYMBALTA) extended release capsule 60 mg  60 mg Oral Daily Adelaida Matos MD   60 mg at 04/04/22 0836    oxyCODONE (OXY-IR) immediate release tablet 15 mg  15 mg Oral 4x Daily PRN Adelaida Matos MD   15 mg at 04/04/22 1057    ondansetron (ZOFRAN-ODT) disintegrating tablet 4 mg  4 mg Oral Q8H PRN Adelaida Matos MD        Or    ondansetron (ZOFRAN) injection 4 mg  4 mg IntraVENous Q6H PRN Adelaida Matos MD        polyethylene glycol (GLYCOLAX) packet 17 g  17 g Oral Daily PRN Adelaida Matos MD        aspirin EC tablet 81 mg  81 mg Oral Daily Adelaida Matos MD   81 mg at 04/04/22 6997    Or    aspirin suppository 300 mg  300 mg Rectal Daily Adelaida Matos MD        perflutren lipid microspheres (DEFINITY) injection 1.65 mg  1.5 mL IntraVENous ONCE PRN Adelaida Matos MD        0.9 % sodium chloride infusion   IntraVENous Continuous Adelaida Matos MD 75 mL/hr at 04/04/22 1058 New Bag at 04/04/22 1058    atorvastatin (LIPITOR) tablet 80 mg  80 mg Oral Nightly Mara Conley MD   80 mg at 04/03/22 2130    heparin (porcine) injection 5,000 Units  5,000 Units SubCUTAneous 3 times per day Adelaida Matos MD   5,000 Units at 04/04/22 0552    labetalol (NORMODYNE;TRANDATE) injection 10 mg  10 mg IntraVENous Q10 Min PRN Adelaida Matos MD        dilTIAZem 125 mg in dextrose 5 % 125 mL infusion  2.5-15 mg/hr IntraVENous Continuous Adelaida Matos MD   Stopped at 04/03/22 0214    ciprofloxacin (CIPRO) tablet 500 mg  500 mg Oral 2 times per day Adelaida Matos MD   500 mg at 04/04/22 0836    metroNIDAZOLE (FLAGYL) tablet 500 mg  500 mg Oral 3 times per day Adelaida Matos MD   500 mg at 04/04/22 0552       ROS : A 10-14 system review of constitutional, cardiovascular, respiratory, eyes, musculoskeletal, endocrine, GI, ENT, skin, hematological, genitourinary, psychiatric and neurologic systems was obtained and updated today and is unremarkable except as mentioned in my HPI      Exam:     Constitutional:   Vitals:    04/03/22 2330 04/04/22 0415 04/04/22 0814 04/04/22 1117   BP: 122/72 (!) 137/90 124/66 138/88   Pulse: 55 60 57 63   Resp: 16 16 16 18   Temp: 98.8 °F (37.1 °C) 98.7 °F (37.1 °C) 98.2 °F (36.8 °C) 98 °F (36.7 °C)   TempSrc: Oral Oral Oral Oral   SpO2: 99% 98% 98% 97%   Weight:       Height:           General appearance and observation: Normal development and appear in no acute distress. Neck: supple  Cardiovascular: No lower leg edema with good pulsation. Mental Status:   Oriented to person, place, problem, and time. Memory: Good immediate recall. Intact remote memory  Normal attention span and concentration. Language: intact naming, repeating and fluency   Good fund of Knowledge. Aware of current events and vocabulary   Cranial Nerves:   II: Visual fields: Full. Pupils: equal, round, reactive to light  III,IV,VI: Extra Ocular Movements are intact. No nystagmus  V: Facial sensation is intact  VII: Facial strength and movements: intact and symmetric  VIII: Hearing: Intact  IX: Palate elevation is symmetric  XI: Shoulder shrug is intact  XII: Tongue movements are normal  Musculoskeletal: 5/5 in all 4 extremities. Tone: Normal tone. Reflexes: Symmetric 2+ in the arms and 2+ in the legs   Planters: flexor bilaterally. Coordination: no pronator drift, no dysmetria with FNF in upper extremities. Normal REM. Sensation: normal to all modalities in both arms and legs. Gait/Posture: steady gait and normal posturing and station.      Data:  LABS:   Lab Results   Component Value Date     04/03/2022    K 4.0 04/03/2022     04/03/2022    CO2 26 04/03/2022    BUN 18 04/03/2022    CREATININE 1.0 04/03/2022    GFRAA >60 04/03/2022    LABGLOM >60 04/03/2022 GLUCOSE 97 04/03/2022    CALCIUM 8.5 04/03/2022     Lab Results   Component Value Date    WBC 6.5 04/03/2022    RBC 4.70 04/03/2022    HGB 13.5 04/03/2022    HCT 40.9 04/03/2022    MCV 87.0 04/03/2022    RDW 14.6 04/03/2022     04/03/2022     Lab Results   Component Value Date    INR 1.00 04/02/2022    PROTIME 11.3 04/02/2022       Neuroimaging was independently reviewed by myself and discussed results with the patient and/or family  Reviewed notes from different physicians  Reviewed lab and blood testing    Impression:    Acute dizziness and right eye diplopia - likely TIA. CT head negative. CTA head/neck without LVO. MRI of brain negative. New onset A. Fib  HLD, uncontrolled. LDL 72. Tobacco abuse    Recommendation:     ECHO ordered. Monitor on tele. Ok for anticoagulation from a neuro standpoint. No need for ASA from a neuro perspective [facility]. Initiated on statin. PT/OT/SLP  Smoking cessation strongly encouraged. Thank you for referring such patient. If you have any questions regarding my consult note, please don't hesitate to call me. Bennett Mason, MACKENZIE    Attending Supervising [de-identified] Attestation Statement      The patient was seen 4/4/2022 in conjunction with the nurse practitioner with independent history, evaluation and examination. I agree with the note which has been adjusted to reflect my findings, with the addition of the following: The patient is 74 y.o.  male  who was admitted for new onset diplopia. Onset was hours prior to admission. Description horizontal diplopia worse with looking down or to the right side, monocular but no headache or weakness or numbness or tingling or speech impairment. Degree was moderate. Duration was few hours. No triggers, relieving or aggravating factors. Today's back to his baseline. Further work-up with MRI brain showed no acute stroke. He was found in A. fib with RVR. Seen by cardiology.   No residual deficit today. On examination:  No acute distress  Awake and alert x3. Fluent speech. Appears appropriate with intact recent and remote memory. Pupil reactive and symmetric, extraocular motor intact, no ophthalmoplegia, face is symmetric and tongue is midline  No focal weakness with symmetric DTR  Normal tone  No sensory disturbance or abnormal movement    Impression:   New onset diplopia, transient. Seems to be improving. No specific etiology but could be TIA  New onset A. fib  Hypertension  Hyperlipidemia  Smoking      Recommendation:  No contraindication from anticoagulation  Stroke education was provided  Statin  A1c  Lipid panel  Echo  Smoking cessation  PT and OT  Discussed risk of blood thinner with the patient and secondary prevention  DC planning when medically stable        Electronically signed by Rosario Woodson MD on 4/4/22 at 3:02 PM EDT        This dictation was generated by voice recognition computer software.  Although all attempts are made to edit the dictation for accuracy, there may be errors in the  transcription that are not intended

## 2022-04-04 NOTE — PROGRESS NOTES
Speech Language Pathology  Facility/Department: [facility] B3 - MED SURG   CLINICAL BEDSIDE SWALLOW EVALUATION    NAME: Charlette Garcia  : 1955  MRN: [id number]    ADMISSION DATE: 2022  ADMITTING DIAGNOSIS: has Left ankle pain; Ankle arthritis; Acute cerebrovascular accident (CVA) ([address] Diplopia; and Paroxysmal atrial fibrillation ([address] on their problem list.  ONSET DATE: 22    Recent Chest Xray:  22  Impression   Clear lungs. Date of Eval: 2022  Evaluating Therapist: Dotty Smith    Current Diet level:  Current Diet : Regular  Current Liquid Diet : Thin    Primary Complaint  Patient Complaint: No speech, lang, cog, dysphagia concerns per Pt or RN. Per MD H&P, \"81 y.o. male who presented to his local emergency department for double vision and dizziness that started acutely when he was chopping wood at home. Initially had double vision, followed by generalized fatigue and some palpitations in addition to dizziness. Thought he would pass out, but he did not. No spinning sensation. Did not fall, did not lose consciousness. Waited at home, and then came to the emergency department once he decided that things were not going to get better. At the emergency department, there was a suspicion for an acute CVA. Stroke team was called. tPA was not administered due to patient being out of time window. Aspirin was started and patient was transferred to our hospital for admission. Also noted that patient had leukocytosis. Empiric antibiotic a single dose was given. No fever, no chills. Diagnosed with atrial fibrillation, which seems to be something new. Patient states that last week he noticed another episode of diverticulitis that he had before. Switched to clear liquid on his own and noted some improvement couple days ago. However, he has not had any bowel movements and still was not able to eat much. The episode of suspected diverticulitis started with nausea and vomiting. recs.  Further ST not indicated at this time, please re-refer ST if changes occur. Vitals:    04/04/22 1117   BP: 138/88   Pulse: 63   Resp: 18   Temp: 98 °F (36.7 °C)   SpO2: 97%     Treatment Plan  Requires SLP Intervention: No     D/C Recommendations: To be determined     Recommended Diet and Intervention  Diet Solids Recommendation: Regular  Liquid Consistency Recommendation: Thin  Recommended Form of Meds: PO     Therapeutic Interventions: Patient/Family education    Compensatory Swallowing Strategies  Compensatory Swallowing Strategies: Upright as possible for all oral intake;Remain upright for 30-45 minutes after meals;Small bites/sips    Treatment/Goals n/a     General  Chart Reviewed: Yes  Comments: Pt admitted with diplopia. Subjective  Subjective: Pt was seen upright in bed. RN OK'd SLP entry. He was pleasant and cooperative. Behavior/Cognition: Alert; Cooperative;Pleasant mood  Respiratory Status: Room air  O2 Device: None (Room air)  Communication Observation: Functional  Follows Directions: Simple  Dentition: Adequate; Some missing teeth  Patient Positioning: Upright in bed  Baseline Vocal Quality: Normal  Prior Dysphagia History: no history of dsyphagia  Consistencies Administered: Thin - cup    Vision/Hearing  Vision  Vision: Impaired  Vision Exceptions: Wears glasses for reading  Hearing  Hearing: Within functional limits    Oral Motor Deficits  Oral/Motor  Oral Motor: Within functional limits    Oral Phase Dysfunction  Oral Phase  Oral Phase: WNL     Indicators of Pharyngeal Phase Dysfunction   Pharyngeal Phase  Pharyngeal Phase: WNL    Prognosis  Prognosis  Prognosis for safe diet advancement: excellent  Individuals consulted  Consulted and agree with results and recommendations: Patient;RN    Education  Patient Education: Pt educated on reason for referral, role of ST, assessment results and recommendations.   Patient Education Response: Verbalizes understanding;Demonstrated understanding  Safety Devices in place: Yes  Type of devices: Left in bed;Call light within reach       Therapy Time  SLP Individual Minutes  Time In: 1120  Time Out: [address]  Minutes: [facility]

## 2022-04-04 NOTE — PLAN OF CARE
Problem: HEMODYNAMIC STATUS  Goal: Patient has stable vital signs and fluid balance  Outcome: Ongoing     Problem: ACTIVITY INTOLERANCE/IMPAIRED MOBILITY  Goal: Mobility/activity is maintained at optimum level for patient  Outcome: Ongoing     Problem: COMMUNICATION IMPAIRMENT  Goal: Ability to express needs and understand communication  Outcome: Ongoing     Problem: Pain:  Goal: Pain level will decrease  Description: Pain level will decrease  Outcome: Ongoing     Problem: Nutrition  Intervention: Swallowing evaluation  4/4/2022 1217 by DIPAK Soliz  Note: Bedside swallow evaluation completed this date. Sharon Mesa M.S. [id number] [facility]  Speech-language pathologist  [id number]         Problem: Nutrition  Intervention: Aspiration precautions  4/4/2022 1217 by DIPAK Soliz  Note: Bedside swallow evaluation completed this date.     Sharon Mesa M.S. [id number] [facility]  Speech-language pathologist  [id number]

## 2022-04-04 NOTE — PROGRESS NOTES
CAMILA and Left heart cath scheduled for 11a and 12p, spouse aware, education provided. Pt and wife verbalized understanding.

## 2022-04-04 NOTE — PROGRESS NOTES
Hospitalist Progress Note      PCP: Hayden Stephens MD    Date of Admission: 4/2/2022    Chief Complaint: Double vision    Hospital Course:   74 y.o. male who presented to his local emergency department for double vision and dizziness that started acutely when he was chopping wood at home. Initially had double vision, followed by generalized fatigue and some palpitations in addition to dizziness. Thought he would pass out, but he did not. No spinning sensation. Did not fall, did not lose consciousness. Waited at home, and then came to the emergency department once he decided that things were not going to get better. At the emergency department, there was a suspicion for an acute CVA. Stroke team was called. tPA was not administered due to patient being out of time window. Aspirin was started and patient was transferred to our hospital for admission. Also noted that patient had leukocytosis. Empiric antibiotic a single dose was given. No fever, no chills. Diagnosed with atrial fibrillation, which seems to be something new. Patient states that last week he noticed another episode of diverticulitis that he had before. Switched to clear liquid on his own and noted some improvement couple days ago. However, he has not had any bowel movements and still was not able to eat much. The episode of suspected diverticulitis started with nausea and vomiting. Since then, oral intake has been reduced. No other accompanying symptoms  Nothing else that makes the patient feel better or worse     Subjective:   Seen resting in bed comfortably. States his vision is nearly back to his baseline. No other complaints at this time. VSS. Afebrile.         Medications:  Reviewed    Infusion Medications    sodium chloride 75 mL/hr at 04/03/22 1715    dilTIAZem Stopped (04/03/22 0214)     Scheduled Medications    metoprolol tartrate  25 mg Oral BID    DULoxetine  60 mg Oral Daily    aspirin  81 mg Oral Daily    Or    aspirin  300 mg Rectal Daily    atorvastatin  80 mg Oral Nightly    heparin (porcine)  5,000 Units SubCUTAneous 3 times per day    ciprofloxacin  500 mg Oral 2 times per day    metroNIDAZOLE  500 mg Oral 3 times per day     PRN Meds: oxyCODONE, ondansetron **OR** ondansetron, polyethylene glycol, perflutren lipid microspheres, labetalol      Intake/Output Summary (Last 24 hours) at 4/4/2022 0840  Last data filed at 4/4/2022 0839  Gross per 24 hour   Intake 480 ml   Output 3575 ml   Net -3095 ml       Physical Exam Performed:    /66   Pulse 57   Temp 98.2 °F (36.8 °C) (Oral)   Resp 16   Ht 6' 1\" (1.854 m)   Wt 184 lb 12.8 oz (83.8 kg)   SpO2 98%   BMI 24.38 kg/m²     General appearance: No apparent distress, appears stated age and cooperative. HEENT: Pupils equal, round, and reactive to light. Conjunctivae/corneas clear. Neck: Supple, with full range of motion. No jugular venous distention. Trachea midline. Respiratory:  Normal respiratory effort. Clear to auscultation, bilaterally without Rales/Wheezes/Rhonchi. Cardiovascular: Regular rate and rhythm with normal S1/S2 without murmurs, rubs or gallops. Abdomen: Soft, non-tender, non-distended with normal bowel sounds. Musculoskeletal: No clubbing, cyanosis or edema bilaterally. Full range of motion without deformity. Skin: Skin color, texture, turgor normal.  No rashes or lesions. Neurologic:  Neurovascularly intact without any focal sensory/motor deficits.  Cranial nerves: II-XII intact, grossly non-focal.  Psychiatric: Alert and oriented, thought content appropriate, normal insight  Capillary Refill: Brisk,3 seconds, normal   Peripheral Pulses: +2 palpable, equal bilaterally       Labs:   Recent Labs     04/02/22  0011 04/03/22  0811   WBC 14.5* 6.5   HGB 17.2 13.5   HCT 51.0 40.9    208     Recent Labs     04/02/22  0011 04/02/22  1205 04/03/22  0811   * 136 143   K 4.0 3.7 4.0   CL 96* 103 108   CO2 25 26 26   BUN 31* 24* 18 metronidazole. -CT scan with oral contrast only due to MAXIMILIANO and contrast with CTA head and neck. Acute kidney injury:  Creatinine decreased toward baseline 0.8. Likely prerenal azotemia treated with IV saline. Poor intake over the past week. Resolved       DVT Prophylaxis: Eliquis  Diet: ADULT DIET;  Regular  Code Status: Full Code    PT/OT Eval Status: Consulted, recommending home with assist     Dispo - Likely 1-2 more days pending clinical course    Fransisca Keen

## 2022-04-05 ENCOUNTER — APPOINTMENT (OUTPATIENT)
Dept: CARDIAC CATH/INVASIVE PROCEDURES | Age: 67
DRG: 068 | End: 2022-04-05
Attending: INTERNAL MEDICINE
Payer: MEDICARE

## 2022-04-05 ENCOUNTER — ANESTHESIA EVENT (OUTPATIENT)
Dept: CARDIAC CATH/INVASIVE PROCEDURES | Age: 67
DRG: 068 | End: 2022-04-05
Payer: MEDICARE

## 2022-04-05 ENCOUNTER — ANESTHESIA (OUTPATIENT)
Dept: CARDIAC CATH/INVASIVE PROCEDURES | Age: 67
DRG: 068 | End: 2022-04-05
Payer: MEDICARE

## 2022-04-05 VITALS
RESPIRATION RATE: 20 BRPM | DIASTOLIC BLOOD PRESSURE: 80 MMHG | SYSTOLIC BLOOD PRESSURE: 120 MMHG | OXYGEN SATURATION: 100 %

## 2022-04-05 LAB
LV EF: 48 %
LVEF MODALITY: NORMAL

## 2022-04-05 PROCEDURE — 2580000003 HC RX 258: Performed by: INTERNAL MEDICINE

## 2022-04-05 PROCEDURE — B2111ZZ FLUOROSCOPY OF MULTIPLE CORONARY ARTERIES USING LOW OSMOLAR CONTRAST: ICD-10-PCS | Performed by: INTERNAL MEDICINE

## 2022-04-05 PROCEDURE — 6370000000 HC RX 637 (ALT 250 FOR IP): Performed by: INTERNAL MEDICINE

## 2022-04-05 PROCEDURE — 3700000000 HC ANESTHESIA ATTENDED CARE

## 2022-04-05 PROCEDURE — B24BZZ4 ULTRASONOGRAPHY OF HEART WITH AORTA, TRANSESOPHAGEAL: ICD-10-PCS | Performed by: INTERNAL MEDICINE

## 2022-04-05 PROCEDURE — 2709999900 HC NON-CHARGEABLE SUPPLY

## 2022-04-05 PROCEDURE — 6370000000 HC RX 637 (ALT 250 FOR IP): Performed by: PHYSICIAN ASSISTANT

## 2022-04-05 PROCEDURE — 85347 COAGULATION TIME ACTIVATED: CPT

## 2022-04-05 PROCEDURE — 99152 MOD SED SAME PHYS/QHP 5/>YRS: CPT | Performed by: INTERNAL MEDICINE

## 2022-04-05 PROCEDURE — 6370000000 HC RX 637 (ALT 250 FOR IP): Performed by: NURSE PRACTITIONER

## 2022-04-05 PROCEDURE — 6360000002 HC RX W HCPCS: Performed by: NURSE ANESTHETIST, CERTIFIED REGISTERED

## 2022-04-05 PROCEDURE — C1751 CATH, INF, PER/CENT/MIDLINE: HCPCS

## 2022-04-05 PROCEDURE — C1894 INTRO/SHEATH, NON-LASER: HCPCS

## 2022-04-05 PROCEDURE — 6360000002 HC RX W HCPCS

## 2022-04-05 PROCEDURE — 1200000000 HC SEMI PRIVATE

## 2022-04-05 PROCEDURE — 2500000003 HC RX 250 WO HCPCS

## 2022-04-05 PROCEDURE — 93325 DOPPLER ECHO COLOR FLOW MAPG: CPT

## 2022-04-05 PROCEDURE — B2151ZZ FLUOROSCOPY OF LEFT HEART USING LOW OSMOLAR CONTRAST: ICD-10-PCS | Performed by: INTERNAL MEDICINE

## 2022-04-05 PROCEDURE — 3700000001 HC ADD 15 MINUTES (ANESTHESIA)

## 2022-04-05 PROCEDURE — 93458 L HRT ARTERY/VENTRICLE ANGIO: CPT

## 2022-04-05 PROCEDURE — 93460 R&L HRT ART/VENTRICLE ANGIO: CPT | Performed by: INTERNAL MEDICINE

## 2022-04-05 PROCEDURE — C1769 GUIDE WIRE: HCPCS

## 2022-04-05 PROCEDURE — 4A023N8 MEASUREMENT OF CARDIAC SAMPLING AND PRESSURE, BILATERAL, PERCUTANEOUS APPROACH: ICD-10-PCS | Performed by: INTERNAL MEDICINE

## 2022-04-05 PROCEDURE — 93315 ECHO TRANSESOPHAGEAL: CPT

## 2022-04-05 RX ORDER — ATORVASTATIN CALCIUM 80 MG/1
80 TABLET, FILM COATED ORAL NIGHTLY
Qty: 30 TABLET | Refills: 3 | Status: SHIPPED | OUTPATIENT
Start: 2022-04-05 | End: 2022-06-15 | Stop reason: SDUPTHER

## 2022-04-05 RX ORDER — SODIUM CHLORIDE 0.9 % (FLUSH) 0.9 %
5-40 SYRINGE (ML) INJECTION EVERY 12 HOURS SCHEDULED
Status: DISCONTINUED | OUTPATIENT
Start: 2022-04-05 | End: 2022-04-06 | Stop reason: HOSPADM

## 2022-04-05 RX ORDER — MIDAZOLAM HYDROCHLORIDE 5 MG/ML
INJECTION INTRAMUSCULAR; INTRAVENOUS
Status: COMPLETED | OUTPATIENT
Start: 2022-04-05 | End: 2022-04-05

## 2022-04-05 RX ORDER — FENTANYL CITRATE 50 UG/ML
INJECTION, SOLUTION INTRAMUSCULAR; INTRAVENOUS
Status: COMPLETED | OUTPATIENT
Start: 2022-04-05 | End: 2022-04-05

## 2022-04-05 RX ORDER — CIPROFLOXACIN 500 MG/1
500 TABLET, FILM COATED ORAL EVERY 12 HOURS SCHEDULED
Qty: 14 TABLET | Refills: 0 | Status: SHIPPED | OUTPATIENT
Start: 2022-04-05 | End: 2022-04-12

## 2022-04-05 RX ORDER — ASPIRIN 81 MG/1
81 TABLET ORAL DAILY
Qty: 30 TABLET | Refills: 3 | Status: SHIPPED | OUTPATIENT
Start: 2022-04-06 | End: 2022-05-03 | Stop reason: SDUPTHER

## 2022-04-05 RX ORDER — HEPARIN SODIUM 1000 [USP'U]/ML
INJECTION, SOLUTION INTRAVENOUS; SUBCUTANEOUS
Status: COMPLETED | OUTPATIENT
Start: 2022-04-05 | End: 2022-04-05

## 2022-04-05 RX ORDER — PROPOFOL 10 MG/ML
INJECTION, EMULSION INTRAVENOUS PRN
Status: DISCONTINUED | OUTPATIENT
Start: 2022-04-05 | End: 2022-04-05 | Stop reason: SDUPTHER

## 2022-04-05 RX ORDER — SODIUM CHLORIDE 9 MG/ML
25 INJECTION, SOLUTION INTRAVENOUS PRN
Status: DISCONTINUED | OUTPATIENT
Start: 2022-04-05 | End: 2022-04-06 | Stop reason: HOSPADM

## 2022-04-05 RX ORDER — DOCUSATE SODIUM 100 MG/1
100 CAPSULE, LIQUID FILLED ORAL 2 TIMES DAILY
Status: DISCONTINUED | OUTPATIENT
Start: 2022-04-05 | End: 2022-04-06 | Stop reason: HOSPADM

## 2022-04-05 RX ORDER — METRONIDAZOLE 500 MG/1
500 TABLET ORAL EVERY 8 HOURS SCHEDULED
Qty: 21 TABLET | Refills: 0 | Status: SHIPPED | OUTPATIENT
Start: 2022-04-05 | End: 2022-04-12

## 2022-04-05 RX ORDER — SODIUM CHLORIDE 0.9 % (FLUSH) 0.9 %
5-40 SYRINGE (ML) INJECTION PRN
Status: DISCONTINUED | OUTPATIENT
Start: 2022-04-05 | End: 2022-04-06 | Stop reason: HOSPADM

## 2022-04-05 RX ADMIN — MIDAZOLAM HYDROCHLORIDE 2 MG: 5 INJECTION INTRAMUSCULAR; INTRAVENOUS at 15:05

## 2022-04-05 RX ADMIN — FENTANYL CITRATE 25 MCG: 50 INJECTION, SOLUTION INTRAMUSCULAR; INTRAVENOUS at 15:05

## 2022-04-05 RX ADMIN — DOCUSATE SODIUM 100 MG: 100 CAPSULE, LIQUID FILLED ORAL at 10:07

## 2022-04-05 RX ADMIN — OXYCODONE HYDROCHLORIDE 15 MG: 15 TABLET ORAL at 22:22

## 2022-04-05 RX ADMIN — METRONIDAZOLE 500 MG: 250 TABLET ORAL at 06:39

## 2022-04-05 RX ADMIN — APIXABAN 5 MG: 5 TABLET, FILM COATED ORAL at 22:22

## 2022-04-05 RX ADMIN — Medication 10 ML: at 20:51

## 2022-04-05 RX ADMIN — MIDAZOLAM HYDROCHLORIDE 1 MG: 5 INJECTION INTRAMUSCULAR; INTRAVENOUS at 14:52

## 2022-04-05 RX ADMIN — DOCUSATE SODIUM 100 MG: 100 CAPSULE, LIQUID FILLED ORAL at 20:51

## 2022-04-05 RX ADMIN — CIPROFLOXACIN 500 MG: 500 TABLET, FILM COATED ORAL at 10:07

## 2022-04-05 RX ADMIN — METOPROLOL TARTRATE 25 MG: 25 TABLET, FILM COATED ORAL at 10:07

## 2022-04-05 RX ADMIN — Medication 10.5 MG: at 00:09

## 2022-04-05 RX ADMIN — ASPIRIN 81 MG: 81 TABLET, COATED ORAL at 10:08

## 2022-04-05 RX ADMIN — CIPROFLOXACIN 500 MG: 500 TABLET, FILM COATED ORAL at 20:51

## 2022-04-05 RX ADMIN — METOPROLOL TARTRATE 25 MG: 25 TABLET, FILM COATED ORAL at 20:51

## 2022-04-05 RX ADMIN — MIDAZOLAM HYDROCHLORIDE 1 MG: 5 INJECTION INTRAMUSCULAR; INTRAVENOUS at 15:00

## 2022-04-05 RX ADMIN — PROPOFOL 300 MG: 10 INJECTION, EMULSION INTRAVENOUS at 14:15

## 2022-04-05 RX ADMIN — METRONIDAZOLE 500 MG: 250 TABLET ORAL at 20:51

## 2022-04-05 RX ADMIN — DULOXETINE HYDROCHLORIDE 60 MG: 60 CAPSULE, DELAYED RELEASE ORAL at 10:07

## 2022-04-05 RX ADMIN — HEPARIN SODIUM 4000 UNITS: 1000 INJECTION, SOLUTION INTRAVENOUS; SUBCUTANEOUS at 15:21

## 2022-04-05 RX ADMIN — FENTANYL CITRATE 25 MCG: 50 INJECTION, SOLUTION INTRAMUSCULAR; INTRAVENOUS at 14:51

## 2022-04-05 RX ADMIN — ATORVASTATIN CALCIUM 80 MG: 80 TABLET, FILM COATED ORAL at 20:51

## 2022-04-05 RX ADMIN — Medication 10.5 MG: at 20:50

## 2022-04-05 RX ADMIN — OXYCODONE HYDROCHLORIDE 15 MG: 15 TABLET ORAL at 16:34

## 2022-04-05 ASSESSMENT — PAIN DESCRIPTION - PROGRESSION

## 2022-04-05 ASSESSMENT — PAIN DESCRIPTION - INTENSITY
RATING_2: 6
RATING_2: 4

## 2022-04-05 ASSESSMENT — PAIN DESCRIPTION - LOCATION
LOCATION_2: NECK
LOCATION_2: NECK
LOCATION: ABDOMEN

## 2022-04-05 ASSESSMENT — PAIN SCALES - GENERAL
PAINLEVEL_OUTOF10: 5
PAINLEVEL_OUTOF10: 0
PAINLEVEL_OUTOF10: 3
PAINLEVEL_OUTOF10: 0
PAINLEVEL_OUTOF10: 7
PAINLEVEL_OUTOF10: 6
PAINLEVEL_OUTOF10: 2

## 2022-04-05 ASSESSMENT — PAIN DESCRIPTION - PAIN TYPE
TYPE_2: CHRONIC PAIN
TYPE_2: CHRONIC PAIN
TYPE: CHRONIC PAIN

## 2022-04-05 ASSESSMENT — LIFESTYLE VARIABLES: SMOKING_STATUS: 1

## 2022-04-05 ASSESSMENT — PAIN DESCRIPTION - ORIENTATION: ORIENTATION: LEFT

## 2022-04-05 NOTE — PROGRESS NOTES
Pt with questions about CAMILA and Cath tomorrow. Information provided within scope, pt excited to speak with Cardiology tomorrow prior to procedure about additional question unable to be answered by nursing regarding rational for procedure and results of Echo.

## 2022-04-05 NOTE — OP NOTE
Operative Note      Patient: Latesha Waggoner  YOB: 1955  MRN: [id number]    Date of Procedure:       Cardiac Cath  Postoperative Note      1. Pre-operative Diagnosis: CVA, TIA, Cardiomyopathy, PFO        Post-operative Diagnosis: Same  2. Surgeons/Assistants: Wilfredo Rosen MD, [facility]  3. Complications: None  4. Estimated Blood Loss: less than 50   5. Specimens: Were Not Obtained  6. Anesthesia: Local and Moderate Sedation  For sedation: Moderated sedation was achieved with Versed (3mg) and Fentanyl (50mcg). Monitoring of the patient's vital signs and respiratory status was provided by a trained independent observer nurse during the entire course of the procedure(s) and under my direct supervision and recorded in patient's medical record. The duration of sedation was 1450 to 1530.    7. Procedure(s) performed: Please see Xims chart for more detailed information on any catheter or equipment use. Further details on the procedure, sedation and proceedings of case  Moderate Sedation  RHC  LHC  LVG  Cors        Procedure Details:  Consent Access  site Bleed Risk Sedat US   Used*? Contrast Flouro TIme Fluoro  mGy   Yes Rt radial a, and brachial v megan MCSFC yes 60mL 2.8 261   *CPT 18344: If stated \"yes\", Ultrasound guidance was used to access rt radial artery using Seldinger technique after local infiltration of 1% lidocaine. Ultrasound(US) documented/visualized size, patency, pulsatility and exact location for puncture and determined ok to proceed. Real-time imaging used for needle entry into the vessel(s). Image was printed off [employer] and sent to medical records on a progress note.    *Sedation Note: MCSFC = minimal conscious sedation for comfort      Left Heart Cath:   Findings   LVEDP 10   LVEF 50%   LV wall motion Mild global HK   Gradient across AV none   Mitral regurg No significant     Coronary Angiogram:  Artery Findings/Result   LM short   LAD Mid LAD luminals, sluggish flow, BRIAN-2   LCx No angiographic CAD  OM1- large vessel, no angiographic CAD   RI    RCA Dominant, prox/mid 30-40% (sluggish flow and to clear dye)         RIGHT HEART CATH:   Pressures   (in mmHg) % Saturation   RA 5 72%   RV 23/5    PA 23/10, 14 69%   PCWP 10    Aortic 130/72 ,97 94%        SAILAJA TD   Cardiac Outpt 6.84 L/min  L/min   Cardiac Index 3.29 L/min/m2  L/min/m2   SVR 1123 Dynes/sec/cm-5  Dynes/sec/cm-5   PVR 47 Dynes/sec/cm-5  Dynes/sec/cm-5       Assessment/Plan  1. Mild Nonobstructive CAD  2. Nonischemic cardimoyopathy: LVEF 45% on echo  3. Normal intracardiac filling pressures with no hemodynamic/sat shunting  4. Trivial-small (stretch) PFO noted on CAMILA  5. Ok to start anticoagulation 6 hrs after cath   - likely need 30day monitor for afib burden      Med Rec:   Recommendation Indicated?  Not Given Due To: Note   DAPT       STATIN - HIGH DOSE      BETA BLOCKER      ACE/ARB/ARNI      ALDACTONE            Electronically signed by Robert Hinson MD on 4/5/2022 at 3:36 PM

## 2022-04-05 NOTE — ANESTHESIA PRE PROCEDURE
Department of Anesthesiology  Preprocedure Note       Name:  Orville Worrell   Age:  74 y.o.  :  1955                                          MRN:  [id number]         Date:  2022      Surgeon: * No surgeons listed *    Procedure: * No procedures listed *    Medications prior to admission:   Prior to Admission medications    Medication Sig Start Date End Date Taking? Authorizing Provider   DULoxetine (CYMBALTA) 60 MG extended release capsule Take 60 mg by mouth daily 21   Historical Provider, MD   oxyCODONE (OXY-IR) 15 MG immediate release tablet Take 15 mg by mouth 4 times daily as needed.  10/5/21   Historical Provider, MD       Current medications:    Current Facility-Administered Medications   Medication Dose Route Frequency Provider Last Rate Last Admin    docusate sodium (COLACE) capsule 100 mg  100 mg Oral BID Mary Anne Pippins, PA   100 mg at 22    apixaban (ELIQUIS) tablet 5 mg  5 mg Oral BID Mary Anne Pippins, PA        melatonin tablet 10.5 mg  10.5 mg Oral Nightly VIKA Vang - CNP   10.5 mg at 22 0009    metoprolol tartrate (LOPRESSOR) tablet 25 mg  25 mg Oral BID Nando Trammell MD   25 mg at 22    DULoxetine (CYMBALTA) extended release capsule 60 mg  60 mg Oral Daily Fili Gracia MD   60 mg at 22    oxyCODONE (OXY-IR) immediate release tablet 15 mg  15 mg Oral 4x Daily PRN Fili Gracia MD   15 mg at 22    ondansetron (ZOFRAN-ODT) disintegrating tablet 4 mg  4 mg Oral Q8H PRN Fili Gracia MD        Or    ondansetron (ZOFRAN) injection 4 mg  4 mg IntraVENous Q6H PRN Fili Gracia MD        polyethylene glycol (GLYCOLAX) packet 17 g  17 g Oral Daily PRN Fili Gracia MD        aspirin EC tablet 81 mg  81 mg Oral Daily Fili Gracia MD   81 mg at 22 1008    Or    aspirin suppository 300 mg  300 mg Rectal Daily Fili Gracia MD        perflutren lipid microspheres (DEFINITY) injection 1.65 mg  1.5 mL IntraVENous ONCE PRN Mo Gallagher MD        atorvastatin (LIPITOR) tablet 80 mg  80 mg Oral Nightly Mo Gallagher MD   80 mg at 04/04/22 2021    labetalol (NORMODYNE;TRANDATE) injection 10 mg  10 mg IntraVENous Q10 Min PRN Mo Gallagher MD        ciprofloxacin (CIPRO) tablet 500 mg  500 mg Oral 2 times per day Mo Gallagher MD   500 mg at 04/05/22 1007    metroNIDAZOLE (FLAGYL) tablet 500 mg  500 mg Oral 3 times per day Mo Gallagher MD   500 mg at 04/05/22 8895       Allergies:  No Known Allergies    Problem List:    Patient Active Problem List   Diagnosis Code    Left ankle pain M25.572    Ankle arthritis M19.079    Acute cerebrovascular accident (CVA) ([facility] [address] I63.9    Diplopia H53.2    Paroxysmal atrial fibrillation ([facility] [address] I48.0    TIA involving basilar artery G45.0    Dyslipidemia E78.5       Past Medical History:        Diagnosis Date    Arthritis     Cerebral artery occlusion with cerebral infarction [facility])        Past Surgical History:        Procedure Laterality Date    ANKLE SURGERY Left     surgeries x3- [id number],[id number],7397    BACK SURGERY      COLONOSCOPY  11/20/2007    WNL    COLONOSCOPY  05 Jan 2017    diverticulosis    FRACTURE SURGERY      JOINT REPLACEMENT         Social History:    Social History     Tobacco Use    Smoking status: Current Every Day Smoker     Packs/day: 1.00     Years: 40.00     Pack years: 40.00     Start date: 4/6/1983    Smokeless tobacco: Never Used   Substance Use Topics    Alcohol use: No     Comment: very rarely                                Ready to quit: No  Counseling given: No      Vital Signs (Current):   Vitals:    04/05/22 0723 04/05/22 1007 04/05/22 1015 04/05/22 1058   BP: 125/77  131/83 120/74   Pulse: 59 69 62 54   Resp: 18  18 18   Temp: 98 °F (36.7 °C)  97.5 °F (36.4 °C) 98 °F (36.7 °C)   TempSrc: Oral  Oral Oral   SpO2: 97%  97% 97%   Weight:       Height:                                                  BP Readings from Last 3 Encounters:   04/05/22 120/74   04/02/22 99/74   01/05/17 120/80       NPO Status:                                                                                 BMI:   Wt Readings from Last 3 Encounters:   04/02/22 184 lb 12.8 oz (83.8 kg)   04/02/22 200 lb (90.7 kg)   01/05/17 200 lb (90.7 kg)     Body mass index is 24.38 kg/m². CBC:   Lab Results   Component Value Date    WBC 6.5 04/03/2022    RBC 4.70 04/03/2022    HGB 13.5 04/03/2022    HCT 40.9 04/03/2022    MCV 87.0 04/03/2022    RDW 14.6 04/03/2022     04/03/2022       CMP:   Lab Results   Component Value Date     04/03/2022    K 4.0 04/03/2022     04/03/2022    CO2 26 04/03/2022    BUN 18 04/03/2022    CREATININE 1.0 04/03/2022    GFRAA >60 04/03/2022    AGRATIO 1.6 04/03/2022    LABGLOM >60 04/03/2022    GLUCOSE 97 04/03/2022    PROT 5.7 04/03/2022    CALCIUM 8.5 04/03/2022    BILITOT 0.4 04/03/2022    ALKPHOS 72 04/03/2022    AST 16 04/03/2022    ALT 13 04/03/2022       POC Tests: No results for input(s): POCGLU, POCNA, POCK, POCCL, POCBUN, POCHEMO, POCHCT in the last 72 hours.     Coags:   Lab Results   Component Value Date    PROTIME 11.3 04/02/2022    INR 1.00 04/02/2022       HCG (If Applicable): No results found for: PREGTESTUR, PREGSERUM, HCG, HCGQUANT     ABGs: No results found for: PHART, PO2ART, JVO8NOR, DTZ1ZQF, BEART, M3QUANCN     Type & Screen (If Applicable):  No results found for: LABABO, LABRH    Drug/Infectious Status (If Applicable):  No results found for: HIV, HEPCAB    COVID-19 Screening (If Applicable):   Lab Results   Component Value Date    COVID19 NOT DETECTED 04/02/2022           Anesthesia Evaluation  Patient summary reviewed and Nursing notes reviewed no history of anesthetic complications:   Airway: Mallampati: II     Neck ROM: full   Dental:          Pulmonary:   (+) current smoker                           Cardiovascular:                      Neuro/Psych:   (+) CVA:, TIA, GI/Hepatic/Renal:             Endo/Other:                     Abdominal:             Vascular: Other Findings:             Anesthesia Plan      MAC     ASA 4     (Medications & allergies reviewed  All available lab & EKG data reviewed)  Induction: intravenous. Anesthetic plan and risks discussed with patient. Plan discussed with CRNA.                   Ileana Kee MD   4/5/2022

## 2022-04-05 NOTE — PROGRESS NOTES
Notified md of eliquis ordered, d/t CAMILA and Left heart cath should we hold, md says yes, hold eliquis for this dose.  Pt aware

## 2022-04-05 NOTE — PROCEDURES
.    Brief Pre-Op Note/Sedation Assessment      Silvia Ingram  1955  [id number]  1:07 PM    Planned Procedure: Cardiac Catheterization Procedure  Post Procedure Plan: Return to same level of care  Consent: I have discussed with the patient and/or the patient representative the indication, alternatives, and the possible risks and/or complications of the planned procedure and the anesthesia methods. The patient and/or patient representative appear to understand and agree to proceed. Chief Complaint:   Chest Pain/Pressure  Dyspnea on Exertion    Pt admitted for CVA and found to have new onset afib, Echo showed possible PFO/ASD with new drop in LVEF and wall motion. Request by Dr. Ynes Richards for CAMILA, [address] [place] [address], [address] [facility] [place]       Indications for Cath Procedure:  1. Presentation:  Cardiomyopathy, LV Dysfunction and Pre-Operative Evaluation - Surgery Type: Cardiac Surgery, Functional Capacity: Unknown, Surgical Risk: Low, Solid Organ Transplant Surgery:  No  2. Anginal Classification within 2 weeks:  CCS II - Slight limitation, with angina only during vigorous physical activity  3. Angina Symptoms Assessment:  Atypical Chest Pain  4. Heart Failure Class within last 2 weeks:  Yes:  Heart Failure Type: Systolic Severity:  Class III - Symptoms of HF on less-than-ordinary exertion  5. Cardiovascular Instability:  No    Prior Ischemic Workup/Eval:  1. Pre-Procedural Medications: Yes: Aspirin  2. Stress Test Completed? No    Does Patient need surgery?   Cath Valve Surgery:  No    Pre-Procedure Medical History:  Vital Signs:  /74   Pulse 54   Temp 98 °F (36.7 °C) (Oral)   Resp 18   Ht 6' 1\" (1.854 m)   Wt 184 lb 12.8 oz (83.8 kg)   SpO2 97%   BMI 24.38 kg/m²     Allergies:  No Known Allergies  Medications:    Current Facility-Administered Medications   Medication Dose Route Frequency Provider Last Rate Last Admin    docusate sodium (COLACE) capsule 100 mg  100 mg Oral BID MORE Suarez   100 mg at 04/05/22 1007    apixaban (ELIQUIS) tablet 5 mg  5 mg Oral BID MORE Larson        melatonin tablet 10.5 mg  10.5 mg Oral Nightly John Mcdonough, APRSHRUTHI - CNP   10.5 mg at 04/05/22 0009    metoprolol tartrate (LOPRESSOR) tablet 25 mg  25 mg Oral BID Leida Foster MD   25 mg at 04/05/22 1007    DULoxetine (CYMBALTA) extended release capsule 60 mg  60 mg Oral Daily Rosario Castellanos MD   60 mg at 04/05/22 1007    oxyCODONE (OXY-IR) immediate release tablet 15 mg  15 mg Oral 4x Daily PRN Rosario Castellanos MD   15 mg at 04/04/22 2138    ondansetron (ZOFRAN-ODT) disintegrating tablet 4 mg  4 mg Oral Q8H PRN Rosario Castellanos MD        Or    ondansetron (ZOFRAN) injection 4 mg  4 mg IntraVENous Q6H PRN Rosario Castellanos MD        polyethylene glycol (GLYCOLAX) packet 17 g  17 g Oral Daily PRN Rosario Castellanos MD        aspirin EC tablet 81 mg  81 mg Oral Daily Rosario Castellanos MD   81 mg at 04/05/22 1008    Or    aspirin suppository 300 mg  300 mg Rectal Daily Rosario Castellanos MD        perflutren lipid microspheres (DEFINITY) injection 1.65 mg  1.5 mL IntraVENous ONCE PRN Rosario Castellanos MD        atorvastatin (LIPITOR) tablet 80 mg  80 mg Oral Nightly Rosario Castellanos MD   80 mg at 04/04/22 2021    labetalol (NORMODYNE;TRANDATE) injection 10 mg  10 mg IntraVENous Q10 Min PRN Rosario Castellanos MD        ciprofloxacin (CIPRO) tablet 500 mg  500 mg Oral 2 times per day Rosario Castellanos MD   500 mg at 04/05/22 1007    metroNIDAZOLE (FLAGYL) tablet 500 mg  500 mg Oral 3 times per day Rosario Castellanos MD   500 mg at 04/05/22 5006       Past Medical History:    Past Medical History:   Diagnosis Date    Arthritis     Cerebral artery occlusion with cerebral infarction [facility])        Surgical History:    Past Surgical History:   Procedure Laterality Date    ANKLE SURGERY Left     surgeries x3- [id number]    BACK SURGERY      COLONOSCOPY  11/20/2007    WNL    COLONOSCOPY  05 Jan 2017    diverticulosis    FRACTURE SURGERY      JOINT REPLACEMENT               Pre-Sedation:  Pre-Sedation Documentation and Exam:  I have assessed the patient and reviewed the H&P on the chart. Prior History of Anesthesia Complications:   none    Modified Mallampati:  III (soft palate, base of uvula visible)    ASA Classification:  Class 3 - A patient with severe systemic disease that limits activity but is not incapacitating    Shanon Scale: Activity:  2 - Able to move 4 extremities voluntarily on command  Respiration:  2 - Able to breathe deeply and cough freely  Circulation:  2 - BP+/- 20mmHg of normal  Consciousness:  2 - Fully awake  Oxygen Saturation (color):  2 - Able to maintain oxygen saturation >92% on room air    Sedation/Anesthesia Plan:  Guard the patient's safety and welfare. Minimize physical discomfort and pain. Minimize negative psychological responses to treatment by providing sedation and analgesia and maximize the potential amnesia. Patient to meet pre-procedure discharge plan.     Medication Planned:  midazolam intravenously and fentanyl intravenously    Patient is an appropriate candidate for plan of sedation:   yes      Electronically signed by Toshia Puga MD on 4/5/2022 at 1:07 PM

## 2022-04-05 NOTE — PROGRESS NOTES
MD notified: Pt ordered Eliquis at 9pm but CAMILA and Cath scheduled for 11and 12p tomorrow 4/5. Pt questing should Eliquis be given? FYI Pt also requesting sleep aid. MD reply: yes it should    Pharm stated typically held for 48 hours. MD notified: Just to clarify per pharm Xarelto and Eliquis are similar and typically would be held for 48 hours prior to cath. But you are saying we should give tonight's dose? MD called to clearify and due to cath and CAMILA being ordered after cards note, MD sated to hold if we believe cath will be happening tomorrow. Med held.

## 2022-04-05 NOTE — ANESTHESIA POSTPROCEDURE EVALUATION
Department of Anesthesiology  Postprocedure Note    Patient: Hailee George  MRN: [id number]  YOB: 1955  Date of evaluation: 4/5/2022  Time:  5:17 PM     Procedure Summary     Date: 04/05/22 Room / Location: [facility] Cardiac Cath Lab    Anesthesia Start: 6589 Anesthesia Stop: 7852    Procedure: TRANSESOPHAGEAL ECHO Diagnosis:     Scheduled Providers:  Responsible Provider: Nikki Villalba MD    Anesthesia Type: MAC ASA Status: 4          Anesthesia Type: MAC    Shanon Phase I: Shanon Score: 7    Shanon Phase II:      Last vitals: Reviewed and per EMR flowsheets.        Anesthesia Post Evaluation    Comments: Postoperative Anesthesia Note    Name:    Hailee George  MRN:      [id number]    Patient Vitals in the past 12 hrs:  04/05/22 1704, BP:(!) 164/84, Pulse:83  04/05/22 1649, BP:(!) 155/84, Pulse:(!) 46  04/05/22 1630, BP:(!) 166/87, Pulse:61  04/05/22 1615, BP:(!) 145/82, Pulse:66  04/05/22 1600, BP:(!) 150/92, Temp:97.7 °F (36.5 °C), Temp src:Oral, Pulse:50, Resp:16, SpO2:98 %  04/05/22 1058, BP:120/74, Temp:98 °F (36.7 °C), Temp src:Oral, Pulse:54, Resp:18, SpO2:97 %  04/05/22 1015, BP:131/83, Temp:97.5 °F (36.4 °C), Temp src:Oral, Pulse:62, Resp:18, SpO2:97 %  04/05/22 1007, Pulse:69  04/05/22 0723, BP:125/77, Temp:98 °F (36.7 °C), Temp src:Oral, Pulse:59, Resp:18, SpO2:97 %     LABS:    CBC  Lab Results       Component                Value               Date/Time                  WBC                      6.5                 04/03/2022 08:11 AM        HGB                      13.5                04/03/2022 08:11 AM        HCT                      40.9                04/03/2022 08:11 AM        PLT                      208                 04/03/2022 08:11 AM   RENAL  Lab Results       Component                Value               Date/Time                  NA                       143                 04/03/2022 08:11 AM        K                        4.0                 04/03/2022 08:11 AM CL                       108                 04/03/2022 08:11 AM        CO2                      26                  04/03/2022 08:11 AM        BUN                      18                  04/03/2022 08:11 AM        CREATININE               1.0                 04/03/2022 08:11 AM        GLUCOSE                  97                  04/03/2022 08:11 AM   COAGS  Lab Results       Component                Value               Date/Time                  PROTIME                  11.3                04/02/2022 12:11 AM        INR                      1.00                04/02/2022 12:11 AM     Intake & Output: In: 600 (P.O.:600)  Out: 2725 (Urine:2725)    Nausea & Vomiting:  No    Level of Consciousness:  Awake    Pain Assessment:  Adequate analgesia    Anesthesia Complications:  No apparent anesthetic complications    SUMMARY      Vital signs stable  OK to discharge from Stage I post anesthesia care.   Care transferred from Anesthesiology department on discharge from perioperative area

## 2022-04-05 NOTE — PROGRESS NOTES
Hospitalist Progress Note      PCP: Dona Honeycutt MD    Date of Admission: 4/2/2022    Chief Complaint: Double vision    Hospital Course:   74 y.o. male who presented to his local emergency department for double vision and dizziness that started acutely when he was chopping wood at home. Initially had double vision, followed by generalized fatigue and some palpitations in addition to dizziness. Thought he would pass out, but he did not. No spinning sensation. Did not fall, did not lose consciousness. Waited at home, and then came to the emergency department once he decided that things were not going to get better. At the emergency department, there was a suspicion for an acute CVA. Stroke team was called. tPA was not administered due to patient being out of time window. Aspirin was started and patient was transferred to our hospital for admission. Also noted that patient had leukocytosis. Empiric antibiotic a single dose was given. No fever, no chills. Diagnosed with atrial fibrillation, which seems to be something new. Patient states that last week he noticed another episode of diverticulitis that he had before. Switched to clear liquid on his own and noted some improvement couple days ago. However, he has not had any bowel movements and still was not able to eat much. The episode of suspected diverticulitis started with nausea and vomiting. Since then, oral intake has been reduced. No other accompanying symptoms  Nothing else that makes the patient feel better or worse     Subjective:   Seen after returning from [facility], patient states he is doing well, denies any pain or complaints.  VSS      Medications:  Reviewed    Infusion Medications     Scheduled Medications    docusate sodium  100 mg Oral BID    apixaban  5 mg Oral BID    melatonin  10.5 mg Oral Nightly    metoprolol tartrate  25 mg Oral BID    DULoxetine  60 mg Oral Daily    aspirin  81 mg Oral Daily    Or    aspirin  300 mg Rectal Daily    atorvastatin  80 mg Oral Nightly    ciprofloxacin  500 mg Oral 2 times per day    metroNIDAZOLE  500 mg Oral 3 times per day     PRN Meds: oxyCODONE, ondansetron **OR** ondansetron, polyethylene glycol, perflutren lipid microspheres, labetalol      Intake/Output Summary (Last 24 hours) at 4/5/2022 1016  Last data filed at 4/5/2022 0413  Gross per 24 hour   Intake 600 ml   Output 1425 ml   Net -825 ml       Physical Exam Performed:    /77   Pulse 69   Temp 98 °F (36.7 °C) (Oral)   Resp 18   Ht 6' 1\" (1.854 m)   Wt 184 lb 12.8 oz (83.8 kg)   SpO2 97%   BMI 24.38 kg/m²     General appearance: No apparent distress, appears stated age and cooperative. HEENT: Pupils equal, round, and reactive to light. Conjunctivae/corneas clear. Neck: Supple, with full range of motion. No jugular venous distention. Trachea midline. Respiratory:  Normal respiratory effort. Clear to auscultation, bilaterally without Rales/Wheezes/Rhonchi. Cardiovascular: Regular rate and rhythm with normal S1/S2 without murmurs, rubs or gallops. Abdomen: Soft, non-tender, non-distended with normal bowel sounds. Musculoskeletal: No clubbing, cyanosis or edema bilaterally. Full range of motion without deformity. Skin: Skin color, texture, turgor normal.  No rashes or lesions. Neurologic:  Neurovascularly intact without any focal sensory/motor deficits.  Cranial nerves: II-XII intact, grossly non-focal.  Psychiatric: Alert and oriented, thought content appropriate, normal insight  Capillary Refill: Brisk,3 seconds, normal   Peripheral Pulses: +2 palpable, equal bilaterally       Labs:   Recent Labs     04/03/22  0811   WBC 6.5   HGB 13.5   HCT 40.9        Recent Labs     04/02/22  1205 04/03/22  0811    143   K 3.7 4.0    108   CO2 26 26   BUN 24* 18   CREATININE 1.1 1.0   CALCIUM 8.2* 8.5     Recent Labs     04/03/22  0811   AST 16   ALT 13   BILITOT 0.4   ALKPHOS 72     No results for input(s): INR neck.     Acute kidney injury, resolved    -Creatinine decreased toward baseline 0.8.    -Likely prerenal azotemia treated with IV saline.    -Poor intake over the past week      DVT Prophylaxis: Eliquis  Diet: Diet NPO Exceptions are: Ice Chips, Sips of Water with Meds, Sips of Clear Liquids  Code Status: Full Code    PT/OT Eval Status: Consulted, recommending home with assist     Dispo - Likely 1-2 more days pending clinical course    Fransisca Arellano

## 2022-04-06 VITALS
SYSTOLIC BLOOD PRESSURE: 121 MMHG | HEART RATE: 64 BPM | WEIGHT: 182.3 LBS | BODY MASS INDEX: 24.16 KG/M2 | RESPIRATION RATE: 18 BRPM | HEIGHT: 73 IN | TEMPERATURE: 97.8 F | OXYGEN SATURATION: 98 % | DIASTOLIC BLOOD PRESSURE: 71 MMHG

## 2022-04-06 LAB
ANION GAP SERPL CALCULATED.3IONS-SCNC: 8 MMOL/L (ref 3–16)
BLOOD CULTURE, ROUTINE: NORMAL
BUN BLDV-MCNC: 15 MG/DL (ref 7–20)
CALCIUM SERPL-MCNC: 8.8 MG/DL (ref 8.3–10.6)
CHLORIDE BLD-SCNC: 102 MMOL/L (ref 99–110)
CO2: 30 MMOL/L (ref 21–32)
CREAT SERPL-MCNC: 1 MG/DL (ref 0.8–1.3)
CULTURE, BLOOD 2: NORMAL
GFR AFRICAN AMERICAN: >60
GFR NON-AFRICAN AMERICAN: >60
GLUCOSE BLD-MCNC: 97 MG/DL (ref 70–99)
HCT VFR BLD CALC: 41.9 % (ref 40.5–52.5)
HEMOGLOBIN: 13.9 G/DL (ref 13.5–17.5)
MCH RBC QN AUTO: 29.1 PG (ref 26–34)
MCHC RBC AUTO-ENTMCNC: 33.1 G/DL (ref 31–36)
MCV RBC AUTO: 88 FL (ref 80–100)
PDW BLD-RTO: 14.7 % (ref 12.4–15.4)
PLATELET # BLD: 225 K/UL (ref 135–450)
PMV BLD AUTO: 7.8 FL (ref 5–10.5)
POC ACT LR: 214 SEC
POTASSIUM SERPL-SCNC: 4.8 MMOL/L (ref 3.5–5.1)
RBC # BLD: 4.76 M/UL (ref 4.2–5.9)
SODIUM BLD-SCNC: 140 MMOL/L (ref 136–145)
WBC # BLD: 7.1 K/UL (ref 4–11)

## 2022-04-06 PROCEDURE — 6370000000 HC RX 637 (ALT 250 FOR IP): Performed by: INTERNAL MEDICINE

## 2022-04-06 PROCEDURE — 80048 BASIC METABOLIC PNL TOTAL CA: CPT

## 2022-04-06 PROCEDURE — 99232 SBSQ HOSP IP/OBS MODERATE 35: CPT | Performed by: NURSE PRACTITIONER

## 2022-04-06 PROCEDURE — 85027 COMPLETE CBC AUTOMATED: CPT

## 2022-04-06 PROCEDURE — 36415 COLL VENOUS BLD VENIPUNCTURE: CPT

## 2022-04-06 PROCEDURE — 2580000003 HC RX 258: Performed by: INTERNAL MEDICINE

## 2022-04-06 RX ADMIN — METRONIDAZOLE 500 MG: 250 TABLET ORAL at 12:36

## 2022-04-06 RX ADMIN — OXYCODONE HYDROCHLORIDE 15 MG: 15 TABLET ORAL at 12:40

## 2022-04-06 RX ADMIN — ASPIRIN 81 MG: 81 TABLET, COATED ORAL at 08:31

## 2022-04-06 RX ADMIN — METRONIDAZOLE 500 MG: 250 TABLET ORAL at 06:21

## 2022-04-06 RX ADMIN — DOCUSATE SODIUM 100 MG: 100 CAPSULE, LIQUID FILLED ORAL at 08:31

## 2022-04-06 RX ADMIN — APIXABAN 5 MG: 5 TABLET, FILM COATED ORAL at 08:31

## 2022-04-06 RX ADMIN — DULOXETINE HYDROCHLORIDE 60 MG: 60 CAPSULE, DELAYED RELEASE ORAL at 08:31

## 2022-04-06 RX ADMIN — CIPROFLOXACIN 500 MG: 500 TABLET, FILM COATED ORAL at 08:31

## 2022-04-06 RX ADMIN — Medication 10 ML: at 08:32

## 2022-04-06 RX ADMIN — OXYCODONE HYDROCHLORIDE 15 MG: 15 TABLET ORAL at 07:25

## 2022-04-06 ASSESSMENT — PAIN DESCRIPTION - PROGRESSION
CLINICAL_PROGRESSION: GRADUALLY IMPROVING

## 2022-04-06 ASSESSMENT — PAIN SCALES - GENERAL
PAINLEVEL_OUTOF10: 0
PAINLEVEL_OUTOF10: 0
PAINLEVEL_OUTOF10: 6
PAINLEVEL_OUTOF10: 0
PAINLEVEL_OUTOF10: 6
PAINLEVEL_OUTOF10: 0

## 2022-04-06 NOTE — PROGRESS NOTES
Patient transferred to A1 from B3. Bed in lowest locked position, call light in reach. VSS. Will monitor.

## 2022-04-06 NOTE — PLAN OF CARE
Problem: ACTIVITY INTOLERANCE/IMPAIRED MOBILITY  Goal: Mobility/activity is maintained at optimum level for patient  Outcome: Ongoing     Problem: Pain:  Goal: Pain level will decrease  Description: Pain level will decrease  Outcome: Ongoing

## 2022-04-06 NOTE — DISCHARGE SUMMARY
Hospital Medicine Discharge Summary    Patient ID: Diana Cuellar      Patient's PCP: Omkar Fernandez MD    Admit Date: 4/2/2022     Discharge Date: 4/6/2022     Admitting Provider: Ana Waldron MD     Discharge Provider: MORE De La Rosa     Discharge Diagnoses: Active Hospital Problems    Diagnosis     [facility] ([facility] [I42.0]     TIA involving basilar artery [G45.0]     Dyslipidemia [E78.5]     Paroxysmal atrial fibrillation ([facility] [I48.0]     Acute cerebrovascular accident (CVA) ([facility] [I63.9]     Diplopia [H53.2]        The patient was seen and examined on day of discharge and this discharge summary is in conjunction with any daily progress note from day of discharge. Hospital Course:   74 y. o. male who presented to his local emergency department for double vision and dizziness that started acutely when he was chopping wood at home. Assen had double vision, followed by generalized fatigue and some palpitations in addition to dizziness.  Thought he would pass out, but he did not.  No spinning sensation.  Did not fall, did not lose consciousness.  Waited at home, and then came to the emergency department once he decided that things were not going to get better. At the emergency department, there was a suspicion for an acute CVA.  Stroke team was called.  tPA was not administered due to patient being out of time window. Adilson Bach was started and patient was transferred to our hospital for admission. Also noted that patient had leukocytosis.  Empiric antibiotic a single dose was given.  No fever, no chills. Diagnosed with atrial fibrillation, which seems to be something new.   Patient states that last week he noticed another episode of diverticulitis that he had before.  Switched to clear liquid on his own and noted some improvement couple days ago. Laurane Halsted, he has not had any bowel movements and still was not able to eat much.  The episode of suspected diverticulitis started with nausea and vomiting.  Since then, oral intake has been reduced. No other accompanying symptoms  Nothing else that makes the patient feel better or worse     Diplopia with suspected but not diagnosed CVA vs TIA.    -MRI brain negative    -Neurology consulted during hospitalization     -Resolves with closing right eye  -Referral made to eye institute [facility], follow-up as needed if symptoms persist      New onset atrial fibrillation, initially rapid ventricular response (HR 150s)   -TSH and troponin normal.  Echo demonstrating LVEF 45-50%, ordered 4/4/2022.  Rate control with metoprolol tartrate 25 mg BID.    -Started on aspirin by cardiology.   -Started on Eliquis 4/4  -LHC 4/5 demonstrated mild nonobstructive CAD     Suspected acute diverticulitis   -Continue ciprofloxacin and metronidazole for a total of 7 days        Acute kidney injury, resolved    -Creatinine decreased toward baseline 0.8.    -Likely prerenal azotemia treated with IV saline.    -Poor intake over the past week    Physical Exam Performed:     /71   Pulse 64   Temp 97.8 °F (36.6 °C) (Oral)   Resp 18   Ht 6' 1\" (1.854 m)   Wt 182 lb 4.8 oz (82.7 kg)   SpO2 98%   BMI 24.05 kg/m²       General appearance:  No apparent distress, appears stated age and cooperative. HEENT:  Normal cephalic, atraumatic without obvious deformity. Pupils equal, round, and reactive to light. Extra ocular muscles intact. Conjunctivae/corneas clear. Neck: Supple, with full range of motion. No jugular venous distention. Trachea midline. Respiratory:  Normal respiratory effort. Clear to auscultation, bilaterally without Rales/Wheezes/Rhonchi. Cardiovascular:  Regular rate and rhythm with normal S1/S2 without murmurs, rubs or gallops. Abdomen: Soft, non-tender, non-distended with normal bowel sounds. Musculoskeletal:  No clubbing, cyanosis or edema bilaterally. Full range of motion without deformity.   Skin: Skin color, texture, turgor normal.  No rashes or lesions. Neurologic:  Neurovascularly intact without any focal sensory/motor deficits. Cranial nerves: II-XII intact, grossly non-focal.  Psychiatric:  Alert and oriented, thought content appropriate, normal insight  Capillary Refill: Brisk,< 3 seconds   Peripheral Pulses: +2 palpable, equal bilaterally       Labs: For convenience and continuity at follow-up the following most recent labs are provided:      CBC:    Lab Results   Component Value Date    WBC 7.1 04/06/2022    HGB 13.9 04/06/2022    HCT 41.9 04/06/2022     04/06/2022       Renal:    Lab Results   Component Value Date     04/06/2022    K 4.8 04/06/2022    K 4.0 04/03/2022     04/06/2022    CO2 30 04/06/2022    BUN 15 04/06/2022    CREATININE 1.0 04/06/2022    CALCIUM 8.8 04/06/2022         Significant Diagnostic Studies    Radiology:   MRI brain without contrast   Final Result   No acute cortical infarct or other acute intracranial process. CT ABDOMEN PELVIS WO CONTRAST Additional Contrast? Oral   Final Result   Scattered subtle injection of the fat in the central mesentery and in the   periaortic region of uncertain etiology. This may simply be due to an early   finding of fluid overload rather than postinflammatory-infectious change. Recommend follow-up imaging with IV contrast for further characterization      Cholelithiasis. No cholecystitis      Moderate stool in colon. A few colonic diverticula are seen. No pericolonic   fat stranding.   Appendix is identified and normal caliber                Consults:     IP CONSULT TO CARDIOLOGY  IP CONSULT TO NEUROLOGY  IP CONSULT TO OPHTHALMOLOGY  IP CONSULT TO CARDIAC REHAB    Disposition:  Home      Condition at Discharge: Stable    Discharge Instructions/Follow-up:    Follow-up with PCP in 1-2 weeks  Follow-up with Cardiology as scheduled    Code Status:  Prior FULL    Activity: activity as tolerated    Diet: regular diet      Discharge Medications:     Discharge Medication List as of 4/6/2022  1:26 PM           Details   aspirin 81 MG EC tablet Take 1 tablet by mouth daily, Disp-30 tablet, R-3Normal      apixaban (ELIQUIS) 5 MG TABS tablet Take 1 tablet by mouth 2 times daily, Disp-60 tablet, R-0Normal      atorvastatin (LIPITOR) 80 MG tablet Take 1 tablet by mouth nightly, Disp-30 tablet, R-3Normal      metoprolol tartrate (LOPRESSOR) 25 MG tablet Take 1 tablet by mouth 2 times daily, Disp-60 tablet, R-3Normal      ciprofloxacin (CIPRO) 500 MG tablet Take 1 tablet by mouth every 12 hours for 14 doses, Disp-14 tablet, R-0Normal      metroNIDAZOLE (FLAGYL) 500 MG tablet Take 1 tablet by mouth every 8 hours for 21 doses, Disp-21 tablet, R-0Normal              Details   DULoxetine (CYMBALTA) 60 MG extended release capsule Take 60 mg by mouth dailyHistorical Med      oxyCODONE (OXY-IR) 15 MG immediate release tablet Take 15 mg by mouth 4 times daily as needed. Historical Med             Time Spent on discharge is more than 30 minutes in the examination, evaluation, counseling and review of medications and discharge plan. Signed:    MORE Ramirez   4/16/2022      Thank you Willard Senior MD for the opportunity to be involved in this patient's care. If you have any questions or concerns please feel free to contact me at [phone].

## 2022-04-06 NOTE — PROGRESS NOTES
[facility]     Cardiology                                     Progress Note    Admission date:  2022    Reason for follow up visit: AF, NICM    HPI/CC: Sebastien Fields was admitted on 2022 with diplopia. Concern for CVA although outside of window for TPA. EKG showed AF which was previously undiagnosed. Echo showed an EF of 45-50% and apical hypokinesis. CAMILA showed trivial PFO. On 2022, he had RHC/LHC that showed mild nonobstructive CAD and EF of 50%. Rhythm has been sinus/SB. Subjective: He has no cardiac complaints. Denies chest pain, palpitations, shortness of breath, and dizziness. Vitals:  Blood pressure 105/62, pulse 63, temperature 98 °F (36.7 °C), temperature source Oral, resp. rate 17, height 6' 1\" (1.854 m), weight 182 lb 4.8 oz (82.7 kg), SpO2 98 %.   Temp  Av.9 °F (36.6 °C)  Min: 97.6 °F (36.4 °C)  Max: 98.3 °F (36.8 °C)  Pulse  Av.1  Min: 46  Max: 83  BP  Min: 93/57  Max: 166/87  SpO2  Av.4 %  Min: 96 %  Max: 100 %    24 hour I/O    Intake/Output Summary (Last 24 hours) at 2022 1031  Last data filed at 2022 1000  Gross per 24 hour   Intake 890 ml   Output 1300 ml   Net -410 ml     Current Facility-Administered Medications   Medication Dose Route Frequency Provider Last Rate Last Admin    docusate sodium (COLACE) capsule 100 mg  100 mg Oral BID Radha Hanson MD   100 mg at 22 0831    apixaban (ELIQUIS) tablet 5 mg  5 mg Oral BID Radha Hanson MD   5 mg at 22 0831    sodium chloride flush 0.9 % injection 5-40 mL  5-40 mL IntraVENous 2 times per day Radha Hanson MD   10 mL at 22 0832    sodium chloride flush 0.9 % injection 5-40 mL  5-40 mL IntraVENous PRN Radha Hanson MD        0.9 % sodium chloride infusion  25 mL IntraVENous PRN Radha Hanson MD        melatonin tablet 10.5 mg  10.5 mg Oral Nightly Radha Hanson MD   10.5 mg at 22    metoprolol tartrate (LOPRESSOR) tablet 25 mg  25 mg Oral BID Ronnell Bates MD   25 mg at 04/05/22 2051    DULoxetine (CYMBALTA) extended release capsule 60 mg  60 mg Oral Daily Ronnell Bates MD   60 mg at 04/06/22 0831    oxyCODONE (OXY-IR) immediate release tablet 15 mg  15 mg Oral 4x Daily PRN Ronnell Bates MD   15 mg at 04/06/22 0725    ondansetron (ZOFRAN-ODT) disintegrating tablet 4 mg  4 mg Oral Q8H PRN Ronnell Bates MD        Or    ondansetron [facility]) injection 4 mg  4 mg IntraVENous Q6H PRN Ronnell Bates MD        polyethylene glycol [facility]) packet 17 g  17 g Oral Daily PRN Ronnell Bates MD        aspirin EC tablet 81 mg  81 mg Oral Daily Ronnell Bates MD   81 mg at 04/06/22 0831    Or    aspirin suppository 300 mg  300 mg Rectal Daily Ronnell Bates MD        perflutren lipid microspheres (DEFINITY) injection 1.65 mg  1.5 mL IntraVENous ONCE PRN Ronnell Bates MD        atorvastatin (LIPITOR) tablet 80 mg  80 mg Oral Nightly Ronnell Bates MD   80 mg at 04/05/22 2051    labetalol (NORMODYNE;TRANDATE) injection 10 mg  10 mg IntraVENous Q10 Min PRN Ronnell Bates MD        ciprofloxacin (CIPRO) tablet 500 mg  500 mg Oral 2 times per day Ronnell Bates MD   500 mg at 04/06/22 0831    metroNIDAZOLE (FLAGYL) tablet 500 mg  500 mg Oral 3 times per day Ronnell Bates MD   500 mg at 04/06/22 7478       Objective:     Telemetry monitor: SR    Physical Exam:  Constitutional and general appearance: alert, cooperative, no distress and appears stated age  HEENT: PERRL, no cervical lymphadenopathy. No masses palpable.  Normal oral mucosa  Respiratory:  · Normal excursion and expansion without use of accessory muscles  · Resp auscultation: Normal breath sounds without wheezing, rhonchi, and rales  Cardiovascular:  · The apical impulse is not displaced  · Heart tones are crisp and normal. regular S1 and S2.  · Jugular venous pulsation Normal  · The carotid upstroke is normal in amplitude and contour without delay or bruit  · Peripheral pulses are symmetrical and full   Abdomen:  · No masses or tenderness  · Bowel sounds present  Extremities:  ·  No cyanosis or clubbing  ·  No lower extremity edema  ·  Skin: warm and dry  Neurological:  · Alert and oriented  · Moves all extremities well  · No abnormalities of mood, affect, memory, mentation, or behavior are noted    Data      Echo 4/4/2022:   Conclusions      Summary   Technically difficult examination. Global left ventricular function is low normal tp mildly decreased with   ejection fraction estimated from 45 % to 50 %. Possible discrete apical hypokinesis. Mild concentric left ventricular hypertrophy. A bubble study was performed and shows late crossing of bubbles from right   to left suggestive of small extra cardiac shunt. Diastolic dysfunction grade and filling pressure are indeterminate. The right ventricle is normal in size and function. The aortic root is dilated at 4.3cm (No Previous)   The aortic root is mildly dilated at 4.1cm (No Previous)   The right atrium is mildly dilated. Trace mitral regurgitation. Inadequate tricuspid regurgitation to estimate systolic pulmonary artery   pressure. IVC is dilated (> 2.1 cm) and collapses > 50% with respiration consistent   with elevated right atrial pressure (8 mmHg). CAMILA 4/5/2022:      Summary   Ejection fraction is visually estimated to be 45-50%. A bubble study was performed and shows evidence of small right to left   shunting consistent with a stretched patent foramen ovale with increased   abdominal pressure. There is no evidence of mass or thrombus in the left atrium or appendage. The right atrium is mildly dilated.          Cath 4/5/2022:  Left Heart Cath:    Findings   LVEDP 10   LVEF 50%   LV wall motion Mild global HK   Gradient across AV none   Mitral regurg No significant      Coronary Angiogram:  Artery Findings/Result   LM short   LAD Mid LAD luminals, sluggish flow, BRIAN-2   LCx No angiographic CAD  OM1- large vessel, no angiographic CAD   RI     RCA Dominant, prox/mid 30-40% (sluggish flow and to clear dye)            RIGHT HEART CATH:    Pressures   (in mmHg) % Saturation   RA 5 72%   RV 23/5     PA 23/10, 14 69%   PCWP 10     Aortic 130/72 ,97 94%           SAILAJA TD   Cardiac Outpt 6.84 L/min  L/min   Cardiac Index 3.29 L/min/m2  L/min/m2   SVR 1123 Dynes/sec/cm-5  Dynes/sec/cm-5   PVR 47 Dynes/sec/cm-5  Dynes/sec/cm-5         Assessment/Plan  1. Mild Nonobstructive CAD  2. Nonischemic cardimoyopathy: LVEF 45% on echo  3. Normal intracardiac filling pressures with no hemodynamic/sat shunting  4. Trivial-small (stretch) PFO noted on CAMILA  5. Ok to start anticoagulation 6 hrs after cath                - likely need 30day monitor for afib burden        Med Rec:            Recommendation Indicated? Not Given Due To: Note   DAPT          STATIN - HIGH DOSE         BETA BLOCKER         ACE/ARB/ARNI         ALDACTONE                     All labs and testing reviewed.   Lab Review     Renal Profile:   Lab Results   Component Value Date    CREATININE 1.0 04/06/2022    BUN 15 04/06/2022     04/06/2022    K 4.8 04/06/2022    K 4.0 04/03/2022     04/06/2022    CO2 30 04/06/2022     CBC:    Lab Results   Component Value Date    WBC 7.1 04/06/2022    RBC 4.76 04/06/2022    HGB 13.9 04/06/2022    HCT 41.9 04/06/2022    MCV 88.0 04/06/2022    RDW 14.7 04/06/2022     04/06/2022     BNP:  No results found for: BNP  Fasting Lipid Panel:    Lab Results   Component Value Date    CHOL 135 04/02/2022    HDL 31 04/02/2022    TRIG 161 04/02/2022     Cardiac Enzymes:  CK/MbTroponin  Lab Results   Component Value Date    TROPONINI <0.01 04/02/2022     PT/ INR   Lab Results   Component Value Date    INR 1.00 04/02/2022    PROTIME 11.3 04/02/2022     PTT No results found for: PTT No results found for: MG No results found for: TSH    Assessment:  Paroxsymal atrial fibrillation: stable   -[id number] score > 2   -TSH normal 4/2022  Nonobstructive CAD: stable    -s/p [facility] 4/5/2022  PFO:   -trival on CAMILA 4/5/2022. NICM: etiology unclear, possibly tachycardia mediated    -EF 40-45% on echo, 50% on C 4/5/2022  TIA   Tobacco abuse   HLD   MAXIMILIANO  Diverticulitis       Plan:   1. Continue ASA, Eliquis, Lipitor and Lopressor   2. Thirty day event monitor at discharge (ordered)  3. Outpatient referral to pulmonology for sleep apnea evaluation   4. Smoking cessation is recommended  5. Office will arrange for follow up  6.  Okay for discharge       VIKA Sarkar-MACKENZIE  [facility]  [phone]

## 2022-04-14 NOTE — PROGRESS NOTES
Physician Progress Note      PATIENT:               Dajuan Sierra  [facility] #:                  [id number]  :                       1955  ADMIT DATE:       2022 9:58 AM  DISCH DATE:        2022 2:30 PM  RESPONDING  PROVIDER #:        Mey MERCADO          QUERY TEXT:    Pt admitted with TIA vs CVA. Pt noted to have afib and PFO. If possible,   please document in progress notes and discharge summary if you are evaluating   and /or treating any of the following: The medical record reflects the following:  Risk Factors: Diplopia, evelyn, acute diverticulitis  Clinical Indicators: AFIB, PFO. Per progress note  \"Diplopia with   suspected but not diagnosed CVA vs TIA\", negative MRI. Per neuro consult note   \"Acute dizziness and right eye diplopia - likely TIA\". Treatment: LHC, CAMILA, Cardiology consult, neuro consult, MRI, serial labs,   supportive care, ASA, Eliquis. Thank you,  [email or web address]  Options provided:  -- TIA symptoms due to CVA  -- Embolic TIA due to cerebral embolism  -- TIA symptoms due to Cerebral Artery Occlusion/Stenosis (without Infarction)  -- TIA symptoms due to other, please specify. -- Other - I will add my own diagnosis  -- Disagree - Not applicable / Not valid  -- Disagree - Clinically unable to determine / Unknown  -- Refer to Clinical Documentation Reviewer    PROVIDER RESPONSE TEXT:    This patient had an embolic TIA due to cerebral embolism.     Query created by: Virgie Bennett on 2022 4:20 PM      Electronically signed by:  Presley Carrillo 2022 11:07 AM

## 2022-04-25 ENCOUNTER — NURSE ONLY (OUTPATIENT)
Dept: CARDIOLOGY CLINIC | Age: 67
End: 2022-04-25

## 2022-04-25 ENCOUNTER — TELEPHONE (OUTPATIENT)
Dept: CARDIOLOGY CLINIC | Age: 67
End: 2022-04-25

## 2022-04-25 DIAGNOSIS — I48.91 ATRIAL FIBRILLATION, UNSPECIFIED TYPE (HCC): Primary | ICD-10-CM

## 2022-04-25 NOTE — PROGRESS NOTES
Monitor placed by [address] [place] [address] Vital Connect  Length of monitor 14 days  Monitor ordered by AM CNP  Bluetooth ID [id number]  Device name: Hung Mcclure successful prior to pt leaving office?  Yes

## 2022-04-25 NOTE — TELEPHONE ENCOUNTER
Placed monitor in office mistakenly only given two patches on my behalf, called and spoke with patient he will come in to  2 vital patches and instructed to wear for a total of 28 days per order from AM NP. He verbalized understanding.

## 2022-04-29 ENCOUNTER — TELEPHONE (OUTPATIENT)
Dept: CARDIOLOGY CLINIC | Age: 67
End: 2022-04-29

## 2022-04-29 NOTE — TELEPHONE ENCOUNTER
Makeda from [facility] calling to report they have not received after the connect date of 4/25. I LMOVM for him to call us.

## 2022-05-02 NOTE — TELEPHONE ENCOUNTER
Patient  came into the office today. I replaced his current patch. The patch connected and was working. I explianed some things to him regarding the phone. He V/U. He was also given extra patches to last 3 weeks,  While the patient was here he requested refills on his new meds. These are being pended for NPAM to sign.

## 2022-05-03 RX ORDER — ASPIRIN 81 MG/1
81 TABLET ORAL DAILY
Qty: 30 TABLET | Refills: 3 | Status: SHIPPED | OUTPATIENT
Start: 2022-05-03 | End: 2022-09-19

## 2022-05-06 ENCOUNTER — TELEPHONE (OUTPATIENT)
Dept: CARDIOLOGY CLINIC | Age: 67
End: 2022-05-06

## 2022-05-06 NOTE — TELEPHONE ENCOUNTER
Pt called and stated that he will be out of Eliquis tomorrow. I advised PA can take a few days to complete/get approval. Do we have samples we can provide pt in the mean time?

## 2022-05-06 NOTE — TELEPHONE ENCOUNTER
[facility] in [facility] called to let NPAM know that the Eliquis called in on 05/03/2022 is requiring a PA.

## 2022-05-09 NOTE — TELEPHONE ENCOUNTER
PA started for elquis through cover my meds. Spoke with pt, samples set aside and informed pt that I have started PA for eliquis.

## 2022-05-13 PROCEDURE — 93272 ECG/REVIEW INTERPRET ONLY: CPT | Performed by: INTERNAL MEDICINE

## 2022-05-19 DIAGNOSIS — I48.0 PAROXYSMAL ATRIAL FIBRILLATION (HCC): ICD-10-CM

## 2022-06-03 PROCEDURE — 93228 REMOTE 30 DAY ECG REV/REPORT: CPT | Performed by: INTERNAL MEDICINE

## 2022-06-14 DIAGNOSIS — I48.0 PAROXYSMAL ATRIAL FIBRILLATION (HCC): ICD-10-CM

## 2022-06-14 NOTE — RESULT ENCOUNTER NOTE
Hi! This was sent to me. Looks like you are seeing him in clinic tomorrow and can review with him then.      Sandhya

## 2022-06-15 ENCOUNTER — OFFICE VISIT (OUTPATIENT)
Dept: CARDIOLOGY CLINIC | Age: 67
End: 2022-06-15
Payer: MEDICARE

## 2022-06-15 VITALS
HEIGHT: 73 IN | WEIGHT: 184.5 LBS | HEART RATE: 71 BPM | BODY MASS INDEX: 24.45 KG/M2 | DIASTOLIC BLOOD PRESSURE: 72 MMHG | OXYGEN SATURATION: 99 % | SYSTOLIC BLOOD PRESSURE: 100 MMHG

## 2022-06-15 DIAGNOSIS — I48.0 PAROXYSMAL ATRIAL FIBRILLATION (HCC): Primary | ICD-10-CM

## 2022-06-15 PROCEDURE — 93000 ELECTROCARDIOGRAM COMPLETE: CPT

## 2022-06-15 PROCEDURE — 1123F ACP DISCUSS/DSCN MKR DOCD: CPT

## 2022-06-15 PROCEDURE — 99215 OFFICE O/P EST HI 40 MIN: CPT

## 2022-06-15 RX ORDER — ATORVASTATIN CALCIUM 80 MG/1
80 TABLET, FILM COATED ORAL NIGHTLY
Qty: 30 TABLET | Refills: 3 | Status: SHIPPED | OUTPATIENT
Start: 2022-06-15

## 2022-06-15 RX ORDER — VERAPAMIL HYDROCHLORIDE 120 MG/1
120 CAPSULE, EXTENDED RELEASE ORAL DAILY
COMMUNITY
Start: 2022-05-25

## 2022-06-15 RX ORDER — DEXTROAMPHETAMINE SACCHARATE, AMPHETAMINE ASPARTATE, DEXTROAMPHETAMINE SULFATE AND AMPHETAMINE SULFATE 5; 5; 5; 5 MG/1; MG/1; MG/1; MG/1
1.5 TABLET ORAL DAILY
COMMUNITY
Start: 2022-06-04 | End: 2022-07-04

## 2022-06-15 NOTE — PATIENT INSTRUCTIONS
Restart atorvastatin (neurology recommendation for transient ischemic attack and coronary artery disease)    Monitor BP and HR at home and call if consistently out of goal ranges. Keep a log and bring to next appointment.      Referral made to Pulmonology for sleep apnea testing    Discuss an implantable loop recorder for closer monitoring of atrial fibrillation events    Follow up with Dr. Karissa Taylor     Will we call you about electrophysiology follow up

## 2022-06-15 NOTE — PROGRESS NOTES
[facility]   Electrophysiology Outpatient Note              Date:  Sharri 15, 2022  Patient name: Yumi Chairez  YOB: 1955    Primary Care physician: Ashley Manuel MD    HISTORY OF PRESENT ILLNESS: The patient is a 74 y.o.  male with a history of PAF, nonobstructive CAD, PFO, nonischemic cardiomyopathy, TIA, HLD, Tobacco abuse, and diverticulitis. In 4/2022 he was admitted to the hospital for diplopia. EKG showed new AF. Echo showed an EF of 45-50% and apical hypokinesis. CAMILA showed trivial PFO. ON 4/05/2022 he had a RHC/LHC that showed mild nonobstructive CAD and EF of 50%. He spontaneously converted to SR during the hospitalization. He was started on Eliquis and Lopressor. He wore a cardiac monitor following discharge from 4/25/2022-5/19/2022 that showed predominately SR with average HR of 72 bpm (). It showed nocturnal bradycardia, PSVT and AF w/ RVR. 0.18% PVC burden, 6.34% PAC burden. Today he is being seen for PAF. EKG shows SR with a HR of 66 bpm. He complains of having no energy and feeling fatigued that started since his hospitalization. He reports waking up and feeling like he could take a nap. He saw his PCP in follow up and his PCP contributed his fatigue to Lopressor, and he was switched to verapamil. He experiences SOB with exertion given his job is very physical. He denies SOB with rest and dizziness. He is still smoking. He does not check his HR at home. He checks his BP but can't recall the typical numbers he sees. He has a lot of concerns regarding his cardiac health and this new diagnosis. Past Medical History:   has a past medical history of Arthritis and Cerebral artery occlusion with cerebral infarction ([facility] [address] Past Surgical History:   has a past surgical history that includes Ankle surgery (Left); Colonoscopy (11/20/2007); Colonoscopy (05 Jan 2017); back surgery; fracture surgery; and joint replacement.      Home Medications:    Prior to Admission medications    Medication Sig Start Date End Date Taking? Authorizing Provider   amphetamine-dextroamphetamine (ADDERALL) 20 MG tablet Take 1.5 tablets by mouth daily. 6/4/22 7/4/22 Yes Historical Provider, MD   verapamil (VERELAN) 120 MG extended release capsule Take 120 mg by mouth daily 5/25/22  Yes Historical Provider, MD   aspirin 81 MG EC tablet Take 1 tablet by mouth daily 5/3/22  Yes VIKA Garcia CNP   apixaban (ELIQUIS) 5 MG TABS tablet Take 1 tablet by mouth 2 times daily 5/3/22  Yes VIKA Garcia CNP   DULoxetine (CYMBALTA) 60 MG extended release capsule Take 60 mg by mouth daily 11/26/21  Yes Historical Provider, MD   oxyCODONE (OXY-IR) 15 MG immediate release tablet Take 15 mg by mouth 4 times daily as needed. 10/5/21  Yes Historical Provider, MD   metoprolol tartrate (LOPRESSOR) 25 MG tablet Take 1 tablet by mouth 2 times daily  Patient not taking: Reported on 6/15/2022 5/3/22   VIKA Garcia CNP   atorvastatin (LIPITOR) 80 MG tablet Take 1 tablet by mouth nightly  Patient not taking: Reported on 6/15/2022 4/5/22   MORE Galvin     Allergies:  Patient has no known allergies. Social History:   reports that he has been smoking. He started smoking about 39 years ago. He has a 40.00 pack-year smoking history. He has never used smokeless tobacco. He reports current alcohol use. He reports current drug use. Family History: family history includes Cancer in his maternal grandmother; Other in his mother. All 14 point review of systems are completed and pertinent positives are mentioned in the history of present illness. Other systems are reviewed and are negative. PHYSICAL EXAM:    Vital signs:    /72   Pulse 71   Ht 6' 1\" (1.854 m)   Wt 184 lb 8 oz (83.7 kg)   SpO2 99%   BMI 24.34 kg/m²      Constitutional and general appearance: alert, cooperative, no distress and appears stated age  HEENT: PERRL, no cervical lymphadenopathy.  No masses palpable. Normal oral mucosa  Respiratory:  · Normal excursion and expansion without use of accessory muscles  · Resp auscultation: Normal breath sounds without wheezing, rhonchi, and rales  Cardiovascular:  · The apical impulse is not displaced  · Heart tones are crisp and normal. regular S1 and S2.  · Jugular venous pulsation Normal  · The carotid upstroke is normal in amplitude and contour without delay or bruit  · Peripheral pulses are symmetrical and full   Abdomen:  · No masses or tenderness  · Bowel sounds present  Extremities:  ·  No cyanosis or clubbing  ·  No lower extremity edema  ·  Skin: warm and dry  Neurological:  · Alert and oriented  · Moves all extremities well  · No abnormalities of mood, affect, memory, mentation, or behavior are noted    DATA:    ECG 6/15/2022:  SR, HR 66 bpm. 1 PAC    Echo 4/4/2022:   Conclusions      Summary   Technically difficult examination.   Global left ventricular function is low normal tp mildly decreased with   ejection fraction estimated from 45 % to 50 %.   Possible discrete apical hypokinesis.   Mild concentric left ventricular hypertrophy.   A bubble study was performed and shows late crossing of bubbles from right   to left suggestive of small extra cardiac shunt.   Diastolic dysfunction grade and filling pressure are indeterminate.   The right ventricle is normal in size and function.   The aortic root is dilated at 4.3cm (No Previous)   The aortic root is mildly dilated at 4.1cm (No Previous)   The right atrium is mildly dilated.   Trace mitral regurgitation.   Inadequate tricuspid regurgitation to estimate systolic pulmonary artery   pressure.   Lattie Brakeman is dilated (> 2.1 cm) and collapses > 50% with respiration consistent   with elevated right atrial pressure (8 mmHg).      CAMILA 4/5/2022:      Summary   Ejection fraction is visually estimated to be 45-50%.   A bubble study was performed and shows evidence of small right to left   shunting consistent with a stretched patent foramen ovale with increased   abdominal pressure.   There is no evidence of mass or thrombus in the left atrium or appendage.   The right atrium is mildly dilated.     Cath 4/5/2022:  Left Heart Cath:    Findings   LVEDP 10   LVEF 50%   LV wall motion Mild global HK   Gradient across AV none   Mitral regurg No significant      Coronary Angiogram:  Artery Findings/Result   LM short   LAD Mid LAD luminals, sluggish flow, BRINA-2   LCx No angiographic CAD  OM1- large vessel, no angiographic CAD   RI     RCA Dominant, prox/mid 30-40% (sluggish flow and to clear dye)        RIGHT HEART CATH:    Pressures   (in mmHg) % Saturation   RA 5 72%   RV 23/5     PA 23/10, 14 69%   PCWP 10     Aortic 130/72 ,97 94%           SAILAJA TD   Cardiac Outpt 6.84 L/min  L/min   Cardiac Index 3.29 L/min/m2  L/min/m2   SVR 1123 Dynes/sec/cm-5  Dynes/sec/cm-5   PVR 47 Dynes/sec/cm-5  Dynes/sec/cm-5     Assessment/Plan  1. Mild Nonobstructive CAD  2. Nonischemic cardimoyopathy: LVEF 45% on echo  3. Normal intracardiac filling pressures with no hemodynamic/sat shunting  4. Trivial-small (stretch) PFO noted on CAMILA  5. Ok to start anticoagulation 6 hrs after cath                - likely need 30day monitor for afib burden       Med Rec:            Recommendation Indicated? Not Given Due To: Note   DAPT          STATIN - HIGH DOSE         BETA BLOCKER         ACE/ARB/ARNI         ALDACTONE              All labs and testing reviewed. CARDIOLOGY LABS:   CBC: No results for input(s): WBC, HGB, HCT, PLT in the last 72 hours. BMP: No results for input(s): NA, K, CO2, BUN, CREATININE, LABGLOM, GLUCOSE in the last 72 hours. PT/INR: No results for input(s): PROTIME, INR in the last 72 hours. APTT:No results for input(s): APTT in the last 72 hours.   FASTING LIPID PANEL:  Lab Results   Component Value Date    HDL 31 04/02/2022    LDLCALC 72 04/02/2022    TRIG 161 04/02/2022     LIVER PROFILE:No results for input(s): AST, ALT, ALB in the last 72

## 2022-06-16 ENCOUNTER — TELEPHONE (OUTPATIENT)
Dept: CARDIOLOGY CLINIC | Age: 67
End: 2022-06-16

## 2022-07-25 ENCOUNTER — OFFICE VISIT (OUTPATIENT)
Dept: CARDIOLOGY CLINIC | Age: 67
End: 2022-07-25
Payer: MEDICARE

## 2022-07-25 VITALS
BODY MASS INDEX: 24.32 KG/M2 | HEIGHT: 73 IN | SYSTOLIC BLOOD PRESSURE: 136 MMHG | HEART RATE: 68 BPM | OXYGEN SATURATION: 97 % | WEIGHT: 183.5 LBS | DIASTOLIC BLOOD PRESSURE: 88 MMHG

## 2022-07-25 DIAGNOSIS — I42.0 DILATED CARDIOMYOPATHY (HCC): ICD-10-CM

## 2022-07-25 DIAGNOSIS — I48.0 PAROXYSMAL ATRIAL FIBRILLATION (HCC): Primary | ICD-10-CM

## 2022-07-25 DIAGNOSIS — G45.0 TIA INVOLVING BASILAR ARTERY: ICD-10-CM

## 2022-07-25 PROCEDURE — 99214 OFFICE O/P EST MOD 30 MIN: CPT | Performed by: INTERNAL MEDICINE

## 2022-07-25 PROCEDURE — 1123F ACP DISCUSS/DSCN MKR DOCD: CPT | Performed by: INTERNAL MEDICINE

## 2022-07-25 PROCEDURE — 93000 ELECTROCARDIOGRAM COMPLETE: CPT | Performed by: INTERNAL MEDICINE

## 2022-07-25 NOTE — PATIENT INSTRUCTIONS
Plan: 1. ILR not recommended as we have already identified AF. -Recommend [employer] Mobile device for AF surveillance ($89 on [employer], $75 at [employer]). -Alternatively recommend obtaining pulse oximeter to monitor HR at home when having symptoms. 2. Smoking cessation strongly recommended. 3. Continue cardiac medications as prescribed. 4. Follow up with EP NP in 3 months.

## 2022-07-25 NOTE — PROGRESS NOTES
[facility]   Cardiac Consultation    Date: 7/25/22  Patient Name: Heide Pina  YOB: 1955    Primary Care Physician: Senthil Seals MD    CHIEF COMPLAINT:   Chief Complaint   Patient presents with    New Patient    Atrial Fibrillation    Cardiomyopathy    Other     TIA       HPI:  Heide Pina is a 74 y.o. male with a history of PAF, PFO, nonischemic cardiomyopathy, TIA/CVA, HLD, tobacco abuse, and diverticulitis. In 4/2022 he was admitted to the hospital for diplopia. ECG showed new AF. Echo showed an EF of 45-50% and apical hypokinesis. CAMILA showed trivial PFO. ON 4/05/2022 he had a RHC/LHC that showed mild nonobstructive CAD and EF of 50%. He spontaneously converted to SR during the hospitalization. He was started on Eliquis and Lopressor. Cardiac event monitor 4/6/2022-4/20/2022 demonstrated predominately NSR with an average HR of 72  () BPM,  brief PAT, no AF noted. He wore another cardiac monitor 4/25/2022-5/19/2022 that showed predominately SR with average HR of 72 bpm (). It showed nocturnal bradycardia, PSVT and brief PAT, 0.18% PVC burden, 6.34% PAC burden. At patient's 6/2022, he complains of having no energy and feeling fatigued that started since his hospitalization. He reported waking up and feeling like he could take a nap. He saw his PCP in follow up and his PCP contributed his fatigue to Lopressor, and he was switched to verapamil. He experienced SOB with exertion given his job is very physical. He denied SOB with rest and dizziness. He is still smoking. He does not check his HR at home. He checks his BP but can't recall the typical numbers he sees. He has a lot of concerns regarding his cardiac health and this new diagnosis. Today, patient reports that he rarely feels his heart beating irregularly. He reports he has not felt any irregular heart beats since his hospitalization 4/2022 but often feels fatigued.  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 He reports he has chronic ankle pain from an injury (fell 3 stories years ago) and follows with an orthopedic doctor for this. He reports that he is able to check his HR and BP utilizing his BP cuff at home. Patient is taking all cardiac medications as prescribed and tolerates them well. Patient denies current edema, chest pain, sob, or syncope. Past Medical History:   has a past medical history of Arthritis and Cerebral artery occlusion with cerebral infarction ([facility] [address] Surgical History:   has a past surgical history that includes Ankle surgery (Left); Colonoscopy (11/20/2007); Colonoscopy (05 Jan 2017); back surgery; fracture surgery; and joint replacement. Social History:   reports that he has been smoking cigarettes. He started smoking about 39 years ago. He has a 40.00 pack-year smoking history. He has never used smokeless tobacco. He reports current alcohol use. He reports current drug use. Family History:  family history includes Cancer in his maternal grandmother; Other in his mother. Home Medications:  Outpatient Encounter Medications as of 7/25/2022   Medication Sig Dispense Refill    verapamil (VERELAN) 120 MG extended release capsule Take 120 mg by mouth daily      atorvastatin (LIPITOR) 80 MG tablet Take 1 tablet by mouth nightly 30 tablet 3    aspirin 81 MG EC tablet Take 1 tablet by mouth daily 30 tablet 3    apixaban (ELIQUIS) 5 MG TABS tablet Take 1 tablet by mouth 2 times daily 60 tablet 0    DULoxetine (CYMBALTA) 60 MG extended release capsule Take 60 mg by mouth daily      oxyCODONE (OXY-IR) 15 MG immediate release tablet Take 15 mg by mouth 4 times daily as needed. metoprolol tartrate (LOPRESSOR) 25 MG tablet Take 1 tablet by mouth 2 times daily (Patient not taking: No sig reported) 60 tablet 3     No facility-administered encounter medications on file as of 7/25/2022. Allergies:  Patient has no known allergies. Review of Systems   Constitutional: Negative. HENT: Negative. Eyes: Negative. Respiratory: Negative. Cardiovascular: Negative. Gastrointestinal: Negative. Genitourinary: Negative. Musculoskeletal: Negative. Skin: Negative. Neurological: Negative. Hematological: Negative. Psychiatric/Behavioral: Negative. /88   Pulse 68   Ht 6' 1\" (1.854 m)   Wt 183 lb 8 oz (83.2 kg)   SpO2 97%   BMI 24.21 kg/m²     DATA:     ECG 7/25/22: Personally reviewed. All current cardiac medications reviewed and adjusted accordingly  7/25/22      Echo 4/4/2022:   Summary   Technically difficult examination. Global left ventricular function is low normal tp mildly decreased with   ejection fraction estimated from 45 % to 50 %. Possible discrete apical hypokinesis. Mild concentric left ventricular hypertrophy. A bubble study was performed and shows late crossing of bubbles from right   to left suggestive of small extra cardiac shunt. Diastolic dysfunction grade and filling pressure are indeterminate. The right ventricle is normal in size and function. The aortic root is dilated at 4.3cm (No Previous)   The aortic root is mildly dilated at 4.1cm (No Previous)   The right atrium is mildly dilated. Trace mitral regurgitation. Inadequate tricuspid regurgitation to estimate systolic pulmonary artery pressure. IVC is dilated (> 2.1 cm) and collapses > 50% with respiration consistent  with elevated right atrial pressure (8 mmHg). CAMILA 4/5/2022:   Ejection fraction is visually estimated to be 45-50%. A bubble study was performed and shows evidence of small right to left   shunting consistent with a stretched patent foramen ovale with increased  abdominal pressure. There is no evidence of mass or thrombus in the left atrium or appendage. The right atrium is mildly dilated.      Cath 4/5/2022:  Left Heart Cath:    Findings LVEDP 10   LVEF 50%   LV wall motion Mild global HK   Gradient across AV none   Mitral regurg No significant      Coronary Angiogram:  Artery Findings/Result   LM short   LAD Mid LAD luminals, sluggish flow, BRIAN-2   LCx No angiographic CAD  OM1- large vessel, no angiographic CAD   RI     RCA Dominant, prox/mid 30-40% (sluggish flow and to clear dye)         RIGHT HEART CATH:    Pressures  (in mmHg) % Saturation   RA 5 72%   RV 23/5     PA 23/10, 14 69%   PCWP 10     Aortic 130/72 ,97 94%           SAILAJA TD   Cardiac Outpt 6.84 L/min  L/min   Cardiac Index 3.29 L/min/m2  L/min/m2   SVR 1123 Dynes/sec/cm-5  Dynes/sec/cm-5   PVR 47 Dynes/sec/cm-5  Dynes/sec/cm-5      Assessment/Plan  1. Mild Nonobstructive CAD  2. Nonischemic cardimoyopathy: LVEF 45% on echo  3. Normal intracardiac filling pressures with no hemodynamic/sat shunting  4. Trivial-small (stretch) PFO noted on CAMILA  5. Ok to start anticoagulation 6 hrs after cath   - likely need 30day monitor for afib burden        Med Rec:            Recommendation Indicated? Not Given Due To: Note   DAPT         STATIN - HIGH DOSE         BETA BLOCKER         ACE/ARB/ARNI         ALDACTONE                Objective:  Physical Exam   Constitutional: He is oriented to person, place, and time. He appears well-developed and well-nourished. HENT:   Head: Normocephalic and atraumatic. Eyes: Pupils are equal, round, and reactive to light. Neck: Normal range of motion. Cardiovascular: Normal rate, regular rhythm and normal heart sounds. Pulmonary/Chest: Effort normal and breath sounds normal.   Abdominal: Soft. No tenderness. Musculoskeletal: Normal range of motion. He exhibits no edema. Neurological: He is alert and oriented to person, place, and time. Skin: Skin is warm and dry. Psychiatric: He has a normal mood and affect. Assessment:  AF- per ECG 4/2022.  Patient has worn 2 monitors since that since that time (4/2022 and 5/2022) and neither demonstrated AF. Recommend [employer] device for AF surveillance. Alternatively recommend obtaining pulse oximeter to monitor HR at home when having symptoms. S/p CVA- 4/2022. Found to have PFO and to be in AF. ILR not recommended as we have already identified AF. Recommend [employer]/pulse oximeter device for AF surveillance and HR behavior when symptoms occur. CM- per 4/2022 echo, LVEF 45-50%. Plan:  1. ILR not recommended as we have already identified AF. -Recommend [employer] Mobile device for AF surveillance ($89 on [employer], $75 at [employer]). -Alternatively recommend obtaining pulse oximeter to monitor HR at home when having symptoms. 2. Smoking cessation strongly recommended. 3. Continue cardiac medications as prescribed. 4. Follow up with EP NP in 3 months. QUALITY MEASURES  1. Tobacco Cessation Counseling: Yes  2. Retake of BP if >140/90:   No  3. Documentation to PCP/referring for new patient:  Sent to PCP at close of office visit  4. CAD patient on anti-platelet: Yes  5. CAD patient on STATIN therapy:  Yes  6. Patient with CHF and aFib on anticoagulation:  Yes    This note has been scribed in the presence of Gloria Michel MD, by Mirta Mata RN.     I, Dr. Gloria Michel, personally performed the services described in this documentation as scribed by  Mirta Mata RN in my presence, and it is both accurate and complete.      Gloria Michel M.D.

## 2022-09-19 RX ORDER — ASPIRIN 81 MG/1
TABLET ORAL
Qty: 90 TABLET | Refills: 3 | Status: SHIPPED | OUTPATIENT
Start: 2022-09-19

## 2022-11-27 ENCOUNTER — APPOINTMENT (OUTPATIENT)
Dept: GENERAL RADIOLOGY | Age: 67
End: 2022-11-27
Payer: MEDICARE

## 2022-11-27 ENCOUNTER — HOSPITAL ENCOUNTER (EMERGENCY)
Age: 67
Discharge: HOME | End: 2022-11-27
Payer: MEDICARE

## 2022-11-27 VITALS
SYSTOLIC BLOOD PRESSURE: 144 MMHG | OXYGEN SATURATION: 99 % | HEART RATE: 77 BPM | TEMPERATURE: 98.2 F | RESPIRATION RATE: 18 BRPM | DIASTOLIC BLOOD PRESSURE: 90 MMHG

## 2022-11-27 DIAGNOSIS — R05.9 COUGH, UNSPECIFIED TYPE: ICD-10-CM

## 2022-11-27 DIAGNOSIS — J98.9 RESPIRATORY ILLNESS: ICD-10-CM

## 2022-11-27 DIAGNOSIS — R06.2 WHEEZING: ICD-10-CM

## 2022-11-27 DIAGNOSIS — J01.00 ACUTE MAXILLARY SINUSITIS, RECURRENCE NOT SPECIFIED: Primary | ICD-10-CM

## 2022-11-27 LAB
RAPID INFLUENZA  B AGN: NEGATIVE
RAPID INFLUENZA A AGN: NEGATIVE

## 2022-11-27 PROCEDURE — 87804 INFLUENZA ASSAY W/OPTIC: CPT

## 2022-11-27 PROCEDURE — 71046 X-RAY EXAM CHEST 2 VIEWS: CPT

## 2022-11-27 PROCEDURE — 99284 EMERGENCY DEPT VISIT MOD MDM: CPT

## 2022-11-27 PROCEDURE — 6370000000 HC RX 637 (ALT 250 FOR IP): Performed by: PHYSICIAN ASSISTANT

## 2022-11-27 RX ORDER — PREDNISONE 20 MG/1
40 TABLET ORAL ONCE
Status: COMPLETED | OUTPATIENT
Start: 2022-11-27 | End: 2022-11-27

## 2022-11-27 RX ORDER — AMOXICILLIN AND CLAVULANATE POTASSIUM 875; 125 MG/1; MG/1
1 TABLET, FILM COATED ORAL ONCE
Status: COMPLETED | OUTPATIENT
Start: 2022-11-27 | End: 2022-11-27

## 2022-11-27 RX ORDER — IPRATROPIUM BROMIDE AND ALBUTEROL SULFATE 2.5; .5 MG/3ML; MG/3ML
1 SOLUTION RESPIRATORY (INHALATION) ONCE
Status: COMPLETED | OUTPATIENT
Start: 2022-11-27 | End: 2022-11-27

## 2022-11-27 RX ORDER — ALBUTEROL SULFATE 90 UG/1
2 AEROSOL, METERED RESPIRATORY (INHALATION) EVERY 6 HOURS PRN
Qty: 18 G | Refills: 0 | Status: SHIPPED | OUTPATIENT
Start: 2022-11-27

## 2022-11-27 RX ORDER — PREDNISONE 20 MG/1
40 TABLET ORAL DAILY
Qty: 8 TABLET | Refills: 0 | Status: SHIPPED | OUTPATIENT
Start: 2022-11-27 | End: 2022-12-01

## 2022-11-27 RX ORDER — AMOXICILLIN AND CLAVULANATE POTASSIUM 875; 125 MG/1; MG/1
1 TABLET, FILM COATED ORAL 2 TIMES DAILY
Qty: 20 TABLET | Refills: 0 | Status: SHIPPED | OUTPATIENT
Start: 2022-11-27 | End: 2022-12-07

## 2022-11-27 RX ADMIN — PREDNISONE 40 MG: 20 TABLET ORAL at 18:17

## 2022-11-27 RX ADMIN — IPRATROPIUM BROMIDE AND ALBUTEROL SULFATE 1 AMPULE: 2.5; .5 SOLUTION RESPIRATORY (INHALATION) at 18:17

## 2022-11-27 RX ADMIN — AMOXICILLIN AND CLAVULANATE POTASSIUM 1 TABLET: 875; 125 TABLET, FILM COATED ORAL at 20:28

## 2022-11-27 ASSESSMENT — ENCOUNTER SYMPTOMS
WHEEZING: 1
SHORTNESS OF BREATH: 0
SORE THROAT: 1
RHINORRHEA: 1
ABDOMINAL PAIN: 0
COUGH: 1
VOMITING: 0

## 2022-11-27 ASSESSMENT — PAIN - FUNCTIONAL ASSESSMENT: PAIN_FUNCTIONAL_ASSESSMENT: NONE - DENIES PAIN

## 2022-11-27 NOTE — ED PROVIDER NOTES
[address] [facility]        Pt Name: Yonas Scherer  MRN: [id number]  [place] 1955  Date of evaluation: 11/27/2022  Provider: Julius Jolly PA-C  PCP: May Valerio MD    Shared Visit or Autonomous Visit: DESIREE. I have evaluated this patient. My supervising physician was available for consultation. CHIEF COMPLAINT       Chief Complaint   Patient presents with    Cough       HISTORY OF PRESENT ILLNESS   (Location/Symptom, Timing/Onset, Context/Setting, Quality, Duration, Modifying Factors, Severity)  Note limiting factors. Yonas Scherer is a 74 y.o. male presenting to the emergency department for evaluation of cough, sinus drainage postnasal drip, chest congestion, hoarse voice and general fatigue symptoms for the past 2 weeks. Smoker. Has had 3 negative COVID 19 tests. The history is provided by the patient. URI  Presenting symptoms: congestion, cough, fatigue, rhinorrhea and sore throat    Presenting symptoms: no fever    Duration:  2 weeks  Progression:  Unchanged  Chronicity:  New  Associated symptoms: wheezing        Nursing Notes were reviewed    REVIEW OF SYSTEMS    (2-9 systems for level 4, 10 or more for level 5)     Review of Systems   Constitutional:  Positive for fatigue. Negative for fever. HENT:  Positive for congestion, rhinorrhea and sore throat. Respiratory:  Positive for cough and wheezing. Negative for shortness of breath. Cardiovascular:  Negative for chest pain. Gastrointestinal:  Negative for abdominal pain and vomiting. Positives and Pertinent negatives as per HPI.        PAST MEDICAL HISTORY     Past Medical History:   Diagnosis Date    Arthritis     Cerebral artery occlusion with cerebral infarction [facility])          SURGICAL HISTORY     Past Surgical History:   Procedure Laterality Date    ANKLE SURGERY Left     surgeries x3- 2007,2008,2011    BACK SURGERY      COLONOSCOPY  11/20/2007    WNL    COLONOSCOPY 05 Jan 2017    diverticulosis    FRACTURE SURGERY      JOINT REPLACEMENT           CURRENTMEDICATIONS       Previous Medications    APIXABAN (ELIQUIS) 5 MG TABS TABLET    Take 1 tablet by mouth 2 times daily    ASPIRIN 81 MG EC TABLET    TAKE 1 TABLET BY MOUTH EVERY DAY    ATORVASTATIN (LIPITOR) 80 MG TABLET    Take 1 tablet by mouth nightly    DULOXETINE (CYMBALTA) 60 MG EXTENDED RELEASE CAPSULE    Take 60 mg by mouth daily    METOPROLOL TARTRATE (LOPRESSOR) 25 MG TABLET    Take 1 tablet by mouth 2 times daily    OXYCODONE (OXY-IR) 15 MG IMMEDIATE RELEASE TABLET    Take 15 mg by mouth 4 times daily as needed. VERAPAMIL (VERELAN) 120 MG EXTENDED RELEASE CAPSULE    Take 120 mg by mouth daily         ALLERGIES     Patient has no known allergies.     FAMILYHISTORY       Family History   Problem Relation Age of Onset    Other Mother         whipple procedure for what they thought was pancreatic tumor-- no tumor found    Cancer Maternal Grandmother         colon cancer - age late 63's          SOCIAL HISTORY       Social History     Socioeconomic History    Marital status:      Spouse name: Srinivasan Hollins    Number of children: 2    Years of education:     Highest education level: None   Tobacco Use    Smoking status: Every Day     Packs/day: 1.00     Years: 40.00     Pack years: 40.00     Types: Cigarettes     Start date: 4/6/1983    Smokeless tobacco: Never   Vaping Use    Vaping Use: Never used   Substance and Sexual Activity    Alcohol use: Yes     Comment: very rarely    Drug use: Yes     Comment: percocet for chronic ankle pain- ordered by physician    Sexual activity: Yes     Partners: Female       SCREENINGS    Deana Coma Scale  Eye Opening: Spontaneous  Best Verbal Response: Oriented  Best Motor Response: Obeys commands  [place]ona Coma Scale Score: 15        PHYSICAL EXAM    (up to 7 for level 4, 8 or more for level 5)     ED Triage Vitals [11/27/22 1745]   BP Temp Temp Source Heart Rate Resp SpO2 Height Weight   (!) 144/90 98.2 °F (36.8 °C) Oral 77 18 99 % -- --       Physical Exam  Vitals and nursing note reviewed. Constitutional:       Appearance: He is well-developed. He is not toxic-appearing. HENT:      Head: Normocephalic and atraumatic. Right Ear: Tympanic membrane, ear canal and external ear normal. Tympanic membrane is not erythematous. Left Ear: Tympanic membrane, ear canal and external ear normal. Tympanic membrane is not erythematous. Nose:      Right Sinus: Maxillary sinus tenderness present. Left Sinus: Maxillary sinus tenderness present. Mouth/Throat:      Mouth: Mucous membranes are moist.      Pharynx: Oropharynx is clear. Uvula midline. Posterior oropharyngeal erythema (mild) present. No uvula swelling. Tonsils: No tonsillar exudate or tonsillar abscesses. Eyes:      Conjunctiva/sclera: Conjunctivae normal.   Cardiovascular:      Rate and Rhythm: Normal rate and regular rhythm. Pulmonary:      Effort: Pulmonary effort is normal.      Breath sounds: No stridor. Wheezing (end expiratory bilateral) present. No rhonchi or rales. Musculoskeletal:      Cervical back: Normal range of motion and neck supple. Skin:     General: Skin is warm and dry. Neurological:      Mental Status: He is alert and oriented to person, place, and time. Motor: No abnormal muscle tone. Psychiatric:         Behavior: Behavior normal. Behavior is cooperative. DIAGNOSTIC RESULTS   LABS:    Labs Reviewed   RAPID INFLUENZA A/B ANTIGENS       Results for orders placed or performed during the hospital encounter of 11/27/22   Rapid influenza A/B antigens    Specimen: Nasopharyngeal   Result Value Ref Range    Rapid Influenza A Ag Negative Negative    Rapid Influenza B Ag Negative Negative       All other labs were not returned as of this dictation. EKG:  All EKG's are interpreted by the Emergency Department Physician in the absence of a cardiologist.  Please see their note for interpretation of EKG. RADIOLOGY:   Non-plain film images such as CT, Ultrasound and MRI are read by the radiologist. Gale Payne radiographic images are visualized andpreliminarily interpreted by the  ED Provider with the below findings:        Interpretation perthe Radiologist below, if available at the time of this note:    XR CHEST (2 VW)   Final Result   No acute cardiopulmonary process identified. XR CHEST (2 VW)    Result Date: 11/27/2022  EXAMINATION: TWO XRAY VIEWS OF THE CHEST 11/27/2022 6:51 pm COMPARISON: None. HISTORY: ORDERING SYSTEM PROVIDED HISTORY: cough r/o pneumonia TECHNOLOGIST PROVIDED HISTORY: Reason for exam:->cough r/o pneumonia Reason for Exam: cough FINDINGS: The mediastinal and cardiac contours are normal.  The lungs are clear. There is no focal consolidation, pleural effusion or pneumothorax evident. The bones are unremarkable. No acute cardiopulmonary process identified. PROCEDURES   Unless otherwise noted below, none     Procedures    CRITICAL CARE TIME   Critical care provided for this patient of which 0 min were spend on critical care and decision making. 0 min spent on procedures. There was imminent failure of an organ system which required critical intervention to prevent clinically significant progression of life threatening deterioration of the patient's condition to the point of disability or death.       CONSULTS:  None      EMERGENCY DEPARTMENT COURSE and DIFFERENTIAL DIAGNOSIS/MDM:   Vitals:    Vitals:    11/27/22 1745   BP: (!) 144/90   Pulse: 77   Resp: 18   Temp: 98.2 °F (36.8 °C)   TempSrc: Oral   SpO2: 99%       Patient was given thefollowing medications:  Medications   amoxicillin-clavulanate (AUGMENTIN) 875-125 MG per tablet 1 tablet (has no administration in time range)   ipratropium-albuterol (DUONEB) nebulizer solution 1 ampule (1 ampule Inhalation Given 11/27/22 1817)   predniSONE (DELTASONE) tablet 40 mg (40 mg Oral Given 11/27/22 1817) Is this patient to be included in the SEP-1 Core Measure due to severe sepsis or septic shock? No   Exclusion criteria - the patient is NOT to be included for SEP-1 Core Measure due to:  2+ SIRS criteria are not met       ED course  Patient presented to the ER for evaluation of URI symptoms. States he has taken 3 COVID-19 test that were all negative. Having fatigue, cough, sinus and chest congestion and a lot of drainage down his throat. He has bilateral maxillary sinus tenderness on exam.  Oropharynx is clear. He has end expiratory wheezes bilaterally given a breathing treatment here with improvement. No acute respiratory distress. Chest x-ray is unremarkable no signs of pneumonia. Influenza is negative. Will be started on Augmentin, prednisone, albuterol inhaler for sinusitis and respiratory infection. Smoking cessation. Mucinex. Close follow-up with his doctor. Return for any worsening. I estimate there is LOW risk for ACUTE RESPIRATORY FAILURE, STATUS ASTHMATICUS, PNEUMONIA, MENINGITIS, PERITONSILLAR ABSCESS, SEPSIS, MALIGNANT OTITIS EXTERNA, OR EPIGLOTTITIS thus I consider the discharge disposition reasonable. FINAL IMPRESSION      1. Acute maxillary sinusitis, recurrence not specified    2. Cough, unspecified type    3. Respiratory illness    4. Wheezing          DISPOSITION/PLAN   DISPOSITION Decision to Discharge    PATIENT REFERREDTO:  Gisele Frankel, MD  . [address]  [phone]    In 3 days      [address]  [facility] Emergency Department  Critical access hospital [address]  [phone]    If symptoms worsen    DISCHARGE MEDICATIONS:  New Prescriptions    ALBUTEROL SULFATE HFA (PROVENTIL HFA) 108 (90 BASE) MCG/ACT INHALER    Inhale 2 puffs into the lungs every 6 hours as needed for Wheezing or Shortness of Breath    AMOXICILLIN-CLAVULANATE (AUGMENTIN) 875-125 MG PER TABLET    Take 1 tablet by mouth 2 times daily for 10 days    PREDNISONE (DELTASONE) 20 MG TABLET    Take 2 tablets by mouth daily for 4 days       DISCONTINUED MEDICATIONS:  Discontinued Medications    No medications on file              (Please note that portions ofthis note were completed with a voice recognition program.  Efforts were made to edit the dictations but occasionally words are mis-transcribed.)    Emir Carey PA-C (electronically signed)            Alejandro Drew PA-C  11/27/22 2024

## 2023-05-24 ENCOUNTER — HOSPITAL ENCOUNTER (EMERGENCY)
Age: 68
Discharge: HOME | End: 2023-05-24
Attending: EMERGENCY MEDICINE
Payer: MEDICARE

## 2023-05-24 ENCOUNTER — APPOINTMENT (OUTPATIENT)
Dept: GENERAL RADIOLOGY | Age: 68
End: 2023-05-24
Payer: MEDICARE

## 2023-05-24 VITALS
RESPIRATION RATE: 10 BRPM | TEMPERATURE: 98.5 F | WEIGHT: 186 LBS | SYSTOLIC BLOOD PRESSURE: 146 MMHG | HEART RATE: 62 BPM | DIASTOLIC BLOOD PRESSURE: 101 MMHG | OXYGEN SATURATION: 97 % | BODY MASS INDEX: 24.54 KG/M2

## 2023-05-24 DIAGNOSIS — I10 HYPERTENSION, UNSPECIFIED TYPE: Primary | ICD-10-CM

## 2023-05-24 DIAGNOSIS — R53.83 OTHER FATIGUE: ICD-10-CM

## 2023-05-24 LAB
ALBUMIN SERPL-MCNC: 4 G/DL (ref 3.4–5)
ALBUMIN/GLOB SERPL: 1.2 {RATIO} (ref 1.1–2.2)
ALP SERPL-CCNC: 95 U/L (ref 40–129)
ALT SERPL-CCNC: 10 U/L (ref 10–40)
ANION GAP SERPL CALCULATED.3IONS-SCNC: 9 MMOL/L (ref 3–16)
AST SERPL-CCNC: 17 U/L (ref 15–37)
BASOPHILS # BLD: 0 K/UL (ref 0–0.2)
BASOPHILS NFR BLD: 0.6 %
BILIRUB SERPL-MCNC: 0.5 MG/DL (ref 0–1)
BUN SERPL-MCNC: 12 MG/DL (ref 7–20)
CALCIUM SERPL-MCNC: 9.4 MG/DL (ref 8.3–10.6)
CHLORIDE SERPL-SCNC: 102 MMOL/L (ref 99–110)
CO2 SERPL-SCNC: 28 MMOL/L (ref 21–32)
CREAT SERPL-MCNC: 0.9 MG/DL (ref 0.8–1.3)
DEPRECATED RDW RBC AUTO: 15.1 % (ref 12.4–15.4)
EOSINOPHIL # BLD: 0.1 K/UL (ref 0–0.6)
EOSINOPHIL NFR BLD: 0.6 %
GFR SERPLBLD CREATININE-BSD FMLA CKD-EPI: >60 ML/MIN/{1.73_M2}
GLUCOSE SERPL-MCNC: 115 MG/DL (ref 70–99)
HCT VFR BLD AUTO: 45.2 % (ref 40.5–52.5)
HGB BLD-MCNC: 15.1 G/DL (ref 13.5–17.5)
LYMPHOCYTES # BLD: 1.9 K/UL (ref 1–5.1)
LYMPHOCYTES NFR BLD: 23 %
MCH RBC QN AUTO: 28.6 PG (ref 26–34)
MCHC RBC AUTO-ENTMCNC: 33.3 G/DL (ref 31–36)
MCV RBC AUTO: 85.8 FL (ref 80–100)
MONOCYTES # BLD: 0.6 K/UL (ref 0–1.3)
MONOCYTES NFR BLD: 7.6 %
NEUTROPHILS # BLD: 5.7 K/UL (ref 1.7–7.7)
NEUTROPHILS NFR BLD: 68.2 %
NT-PROBNP SERPL-MCNC: 1109 PG/ML (ref 0–124)
PLATELET # BLD AUTO: 307 K/UL (ref 135–450)
PMV BLD AUTO: 7.8 FL (ref 5–10.5)
POTASSIUM SERPL-SCNC: 3.8 MMOL/L (ref 3.5–5.1)
PROT SERPL-MCNC: 7.3 G/DL (ref 6.4–8.2)
RBC # BLD AUTO: 5.27 M/UL (ref 4.2–5.9)
SODIUM SERPL-SCNC: 139 MMOL/L (ref 136–145)
TROPONIN, HIGH SENSITIVITY: 12 NG/L (ref 0–22)
WBC # BLD AUTO: 8.4 K/UL (ref 4–11)

## 2023-05-24 PROCEDURE — 80053 COMPREHEN METABOLIC PANEL: CPT

## 2023-05-24 PROCEDURE — 99285 EMERGENCY DEPT VISIT HI MDM: CPT

## 2023-05-24 PROCEDURE — 83880 ASSAY OF NATRIURETIC PEPTIDE: CPT

## 2023-05-24 PROCEDURE — 84484 ASSAY OF TROPONIN QUANT: CPT

## 2023-05-24 PROCEDURE — 93005 ELECTROCARDIOGRAM TRACING: CPT | Performed by: PHYSICIAN ASSISTANT

## 2023-05-24 PROCEDURE — 85025 COMPLETE CBC W/AUTO DIFF WBC: CPT

## 2023-05-24 PROCEDURE — 6370000000 HC RX 637 (ALT 250 FOR IP): Performed by: EMERGENCY MEDICINE

## 2023-05-24 PROCEDURE — 71045 X-RAY EXAM CHEST 1 VIEW: CPT

## 2023-05-24 RX ORDER — OXYCODONE 13.5 MG/1
CAPSULE, EXTENDED RELEASE ORAL
COMMUNITY
Start: 2023-04-30

## 2023-05-24 RX ADMIN — VERAPAMIL HYDROCHLORIDE 120 MG: 120 TABLET, FILM COATED, EXTENDED RELEASE ORAL at 14:30

## 2023-05-24 ASSESSMENT — PAIN - FUNCTIONAL ASSESSMENT
PAIN_FUNCTIONAL_ASSESSMENT: NONE - DENIES PAIN
PAIN_FUNCTIONAL_ASSESSMENT: 0-10

## 2023-05-24 ASSESSMENT — PAIN SCALES - GENERAL: PAINLEVEL_OUTOF10: 0

## 2023-05-24 NOTE — ED NOTES
Discharge instructions reviewed without questions. No further needs voiced at this time. Ambulatory from department in stable condition.        Eric Mancia RN  05/24/23 [id number]

## 2023-05-24 NOTE — DISCHARGE INSTRUCTIONS
-Take your medications as directed. -Follow up with your primary doctor next week. -Come back if you feel worse.

## 2023-05-24 NOTE — ED PROVIDER NOTES
I independently performed a history and physical on Esmer Scott. All diagnostic, treatment, and disposition decisions were made by myself in conjunction with the advanced practice provider. For further details of Daphne Garcia emergency department encounter, please see MORE Gautam's documentation. Chief Complaint   Patient presents with    Hypertension     Had a tia a year ago, bp has been running high the past 3 days and patient has been tired, was placed on metoprolol a month ago, went back to see pcp and ran out of metoprolol, has been back on it for past 2 days, pain meds were all changed a month ago by pain doctor     Patient reports that he does not take his medicines until recently when he restarted them but does have general fatigue and headaches and high blood pressure recently which is unusual for him. He is not very well clinic with his medicines and is not certain which ones he was taking and not taking. He had a prior TIA. He was not aware that he had a PFO. On exam heart regular rhythm lungs clear to station bilaterally and abdomen benign. Awake, alert and oriented to person, place and time. Strength 5/5 in bilateral upper and lower extremities. Sensation is intact to light touch in the upper and lower extremities. Cranial Nerves 2-12 are intact. Patellar DTRs intact. Finger-to-nose intact. Heel-to-shin intact. Normal Romberg's test.      Labs Reviewed   COMPREHENSIVE METABOLIC PANEL - Abnormal; Notable for the following components:       Result Value    Glucose 115 (*)     All other components within normal limits   BRAIN NATRIURETIC PEPTIDE - Abnormal; Notable for the following components:    Pro-BNP 1,109 (*)     All other components within normal limits   CBC WITH AUTO DIFFERENTIAL   TROPONIN     XR CHEST PORTABLE   Final Result   No acute disease             Point-of-care ultrasound performed by me:  No results found.     I personally saw this patient and performed
Reviewed ECG completed for aKtey Murrell. I did not see this patient and was not involved in their care. ECG  The Ekg interpreted by me shows  sinus arrhythmia with a rate of 65  Axis is   Normal  QTc is   465  Intervals and Durations are unremarkable.       ST Segments: no acute change  No significant change from prior EKG dated 7/25/22        Asaf Austin MD  05/24/23 0723
murmur or rubs. Chest/Lungs: CTAB, no wheezes or crackles  Abdomen: soft, nondistended, no tenderness to palpation   Extremities:  cap refill <2 UE/LE, no tenderness of calves, no edema  Neuro: Moving all extremities, no facial droop, no slurred speech, answers questions appropriately. Cranial nerves II to XII intact. Normal finger-to-nose. SILT. 5 out of 5 strength bilateral upper and lower extremities  Skin: Warm. No visible rash, lesions, or bruising       DIAGNOSTIC RESULTS   LABS:    Labs Reviewed   COMPREHENSIVE METABOLIC PANEL - Abnormal; Notable for the following components:       Result Value    Glucose 115 (*)     All other components within normal limits   BRAIN NATRIURETIC PEPTIDE - Abnormal; Notable for the following components:    Pro-BNP 1,109 (*)     All other components within normal limits   CBC WITH AUTO DIFFERENTIAL   TROPONIN       EKG: When ordered, EKG's are interpreted by the Emergency Department Physician in the absence of a cardiologist.  Please see their note for interpretation of EKG. RADIOLOGY:   XR CHEST PORTABLE   Final Result   No acute disease           No results found. No results found. PROCEDURES       CRITICAL CARE TIME   I personally saw the patient and independently provided 0 minutes of non-concurrent critical care time out of the total critical care time provided. This excludes time spent doing separately billable procedures. This includes time at the bedside, data interpretation, medication management, obtaining critical history from collateral sources if the patient is unable to provide it directly, and physician consultation. Specifics of interventions taken and potentially life-threatening diagnostic considerations are listed above in the medical decision making.     CONSULTS   None    ED COURSE and MEDICAL DECISIONS MAKING:   Vitals:    Vitals:    05/24/23 1252 05/24/23 1352 05/24/23 1525 05/24/23 1624   BP: (!) 156/95 (!) 165/99 (!) 163/101 (!) 146/101

## 2023-05-26 LAB
EKG ATRIAL RATE: 65 BPM
EKG DIAGNOSIS: NORMAL
EKG P AXIS: 11 DEGREES
EKG P-R INTERVAL: 156 MS
EKG Q-T INTERVAL: 448 MS
EKG QRS DURATION: 86 MS
EKG QTC CALCULATION (BAZETT): 465 MS
EKG R AXIS: 40 DEGREES
EKG T AXIS: -22 DEGREES
EKG VENTRICULAR RATE: 65 BPM

## 2023-05-26 PROCEDURE — 93010 ELECTROCARDIOGRAM REPORT: CPT | Performed by: INTERNAL MEDICINE

## 2023-09-21 RX ORDER — ASPIRIN 81 MG/1
81 TABLET, COATED ORAL DAILY
Qty: 90 TABLET | Refills: 3 | Status: SHIPPED | OUTPATIENT
Start: 2023-09-21

## 2023-09-21 NOTE — TELEPHONE ENCOUNTER
Last ov- 07/29/2022 MARISA  Last ekg- 05/24/2023  CMP- 05/24/2023    MARISA- Are you ok with refilling this medication?